# Patient Record
Sex: FEMALE | Race: WHITE | NOT HISPANIC OR LATINO | Employment: OTHER | URBAN - METROPOLITAN AREA
[De-identification: names, ages, dates, MRNs, and addresses within clinical notes are randomized per-mention and may not be internally consistent; named-entity substitution may affect disease eponyms.]

---

## 2017-01-09 RX ORDER — SOTALOL HYDROCHLORIDE 80 MG/1
TABLET ORAL
Qty: 135 TABLET | Refills: 3 | Status: SHIPPED | OUTPATIENT
Start: 2017-01-09 | End: 2017-05-15 | Stop reason: SDUPTHER

## 2017-01-10 DIAGNOSIS — E03.4 HYPOTHYROIDISM DUE TO ACQUIRED ATROPHY OF THYROID: ICD-10-CM

## 2017-01-10 RX ORDER — LEVOTHYROXINE SODIUM 88 UG/1
TABLET ORAL
Qty: 90 TABLET | Refills: 3 | Status: SHIPPED | OUTPATIENT
Start: 2017-01-10 | End: 2017-01-11 | Stop reason: SDUPTHER

## 2017-01-10 NOTE — TELEPHONE ENCOUNTER
----- Message from Alessandra Degroot sent at 1/10/2017  1:52 PM CST -----  levothyroxine (SYNTHROID) 88 MCG tablet refill    [facility]/pharmacy #[id number] - [place] - [address]  [address]  [facility] [address]  Phone: [phone] Fax: [phone]

## 2017-01-11 DIAGNOSIS — E03.4 HYPOTHYROIDISM DUE TO ACQUIRED ATROPHY OF THYROID: ICD-10-CM

## 2017-01-11 RX ORDER — LEVOTHYROXINE SODIUM 88 UG/1
TABLET ORAL
Qty: 90 TABLET | Refills: 3 | Status: SHIPPED | OUTPATIENT
Start: 2017-01-11 | End: 2018-02-01 | Stop reason: SDUPTHER

## 2017-01-11 NOTE — TELEPHONE ENCOUNTER
----- Message from Ariane Farrar sent at 1/11/2017 12:55 PM CST -----  Patient needs a refill of medication:Levothyroxin called into [facility] pharmacy on [address]. Please order or call patient back at [phone] if you have any questions. Thanks!

## 2017-02-16 ENCOUNTER — OFFICE VISIT (OUTPATIENT)
Dept: FAMILY MEDICINE | Facility: CLINIC | Age: 77
End: 2017-02-16
Payer: MEDICARE

## 2017-02-16 VITALS
HEIGHT: 64 IN | OXYGEN SATURATION: 96 % | WEIGHT: 158.31 LBS | HEART RATE: 64 BPM | TEMPERATURE: 99 F | DIASTOLIC BLOOD PRESSURE: 60 MMHG | BODY MASS INDEX: 27.03 KG/M2 | SYSTOLIC BLOOD PRESSURE: 124 MMHG

## 2017-02-16 DIAGNOSIS — J06.9 VIRAL URI: Primary | ICD-10-CM

## 2017-02-16 PROCEDURE — 99213 OFFICE O/P EST LOW 20 MIN: CPT | Mod: S$GLB,,, | Performed by: FAMILY MEDICINE

## 2017-02-16 PROCEDURE — 1125F AMNT PAIN NOTED PAIN PRSNT: CPT | Mod: S$GLB,,, | Performed by: FAMILY MEDICINE

## 2017-02-16 PROCEDURE — 1160F RVW MEDS BY RX/DR IN RCRD: CPT | Mod: S$GLB,,, | Performed by: FAMILY MEDICINE

## 2017-02-16 PROCEDURE — 99999 PR PBB SHADOW E&M-EST. PATIENT-LVL III: CPT | Mod: PBBFAC,,, | Performed by: FAMILY MEDICINE

## 2017-02-16 PROCEDURE — 1159F MED LIST DOCD IN RCRD: CPT | Mod: S$GLB,,, | Performed by: FAMILY MEDICINE

## 2017-02-16 PROCEDURE — 1157F ADVNC CARE PLAN IN RCRD: CPT | Mod: S$GLB,,, | Performed by: FAMILY MEDICINE

## 2017-02-16 RX ORDER — BENZONATATE 100 MG/1
100 CAPSULE ORAL 3 TIMES DAILY PRN
Qty: 30 CAPSULE | Refills: 0 | Status: ON HOLD | OUTPATIENT
Start: 2017-02-16 | End: 2017-02-26 | Stop reason: HOSPADM

## 2017-02-16 NOTE — PROGRESS NOTES
Subjective:       Patient ID: Jackie Landa is a 76 y.o. female.    Chief Complaint: Sinus/chest congestion (Symptoms started Monday evening); Feverish; and Mouth Lesions    HPI Comments: C/o 4 day h/o ST, loss of voice, cough with yellow sputum, rhinorrhea.  Was getting better, then noticed some increased chest congestion and cough.  No fever.  Trying Theraflu, Mucinex 12 hour.    Mouth Lesions    Associated symptoms include rhinorrhea, sore throat and cough. Pertinent negatives include no fever, no abdominal pain, no diarrhea, no nausea, no vomiting, no headaches, no mouth sores, no neck pain, no wheezing, no rash and no eye pain.       Past Medical History   Diagnosis Date    A-fib     ARDS (adult respiratory distress syndrome) 2000    Bladder cancer      s/p BCG, interferon; cancer free since 2006    COPD (chronic obstructive pulmonary disease)      pt has hx of ARDS-hospitalized 1mos    Coronary artery disease      9/2016-Mild, moderate stenosis. Pre and post-stent of the LAD verified by FFR.    Depression     Hiatal hernia     HLD (hyperlipidemia)     Hypothyroidism     Intermittent self-catheterization of bladder     MVP (mitral valve prolapse)     Neurogenic bladder        Past Surgical History   Procedure Laterality Date    Breast surgery  2001     right breast lumpectomy    Appendectomy  1965    Ovarian cyst removal  1975     left and right    Eye surgery      Hernia repair      Hysterectomy  1965    Cardiac pacemaker placement  9/27/13    Coronary angioplasty with stent placement  2006     LAD    Bladder stimulator  2008     not working    Cataract extraction w/ intraocular lens  implant, bilateral  2002    Esophagogastroduodenoscopy      Colonoscopy      Fracture surgery  2009     right ankle with hardware       Review of patient's allergies indicates:   Allergen Reactions    Codeine Itching    Phenergan [promethazine] Itching    Bactrim [sulfamethoxazole-trimethoprim]  Hives    Darcalma [zrnwmp-mvzbg-b.blue-sal-sodium] Other (See Comments)     tachycardia    Levaquin [levofloxacin] Rash and Other (See Comments)     headache    Pyridium [phenazopyridine] Rash       Social History     Social History    Marital status:      Spouse name: N/A    Number of children: 4    Years of education: N/A     Occupational History          Social History Main Topics    Smoking status: Former Smoker     Years: 30.00     Types: Cigarettes     Quit date: 8/14/1987    Smokeless tobacco: Never Used    Alcohol use Yes      Comment: one daily    Drug use: No    Sexual activity: Not on file     Other Topics Concern    Not on file     Social History Narrative    living with her sister        Enjoys RV               Current Outpatient Prescriptions on File Prior to Visit   Medication Sig Dispense Refill    aspirin (ECOTRIN) 81 MG EC tablet Take 81 mg by mouth once daily.      buPROPion (WELLBUTRIN XL) 150 MG TB24 tablet Take 150 mg by mouth once daily.      levothyroxine (SYNTHROID) 88 MCG tablet TAKE 1 TABLET EVERY DAY 90 tablet 3    nitroGLYCERIN (NITROSTAT) 0.4 MG SL tablet Place 1 tablet (0.4 mg total) under the tongue every 5 (five) minutes as needed for Chest pain. 25 tablet 11    omeprazole (PRILOSEC) 40 MG capsule Take 20 mg by mouth daily as needed.      oxybutynin (DITROPAN XL) 15 MG TR24 TAKE 1 TABLET DAILY 90 tablet 1    oxybutynin (DITROPAN-XL) 5 MG TR24 Take 5 mg by mouth once daily.      simvastatin (ZOCOR) 40 MG tablet TAKE 1 TABLET BY MOUTH EVERY EVENING 90 tablet 3    sotalol (BETAPACE) 80 MG tablet Take 80mg QAM and 40mg  tablet 3    tramadol (ULTRAM) 50 mg tablet TAKE 1 TABLET BY MOUTH EVERY 6 HOURS AS NEEDED FOR PAIN, MAY CAUSE SEDATION 30 tablet 0     No current facility-administered medications on file prior to visit.        Family History   Problem Relation Age of Onset    Cancer Father      adrenal    Cancer Paternal Aunt      breast  "   Heart disease Mother        Review of Systems   Constitutional: Negative for appetite change, chills, fever and unexpected weight change.   HENT: Positive for rhinorrhea, sore throat and voice change. Negative for mouth sores and trouble swallowing.    Eyes: Negative for pain and visual disturbance.   Respiratory: Positive for cough. Negative for shortness of breath and wheezing.    Cardiovascular: Negative for chest pain and palpitations.   Gastrointestinal: Negative for abdominal pain, blood in stool, diarrhea, nausea and vomiting.   Genitourinary: Negative for difficulty urinating, dysuria and hematuria.   Musculoskeletal: Negative for arthralgias, gait problem and neck pain.   Skin: Negative for rash and wound.   Neurological: Negative for dizziness, weakness, numbness and headaches.   Hematological: Negative for adenopathy.   Psychiatric/Behavioral: Negative for dysphoric mood.       Objective:      Visit Vitals    /60 (BP Location: Left arm, Patient Position: Sitting, BP Method: Manual)    Pulse 64    Temp 98.9 °F (37.2 °C) (Oral)    Ht 5' 4" (1.626 m)    Wt 71.8 kg (158 lb 4.6 oz)    SpO2 96%    BMI 27.17 kg/m2     Physical Exam   Constitutional: She appears well-developed and well-nourished.   HENT:   Head: Normocephalic and atraumatic.   Right Ear: Tympanic membrane, external ear and ear canal normal.   Left Ear: Hearing, tympanic membrane and external ear normal.   Nose: Nose normal. No rhinorrhea or septal deviation. Right sinus exhibits no maxillary sinus tenderness and no frontal sinus tenderness. Left sinus exhibits no maxillary sinus tenderness and no frontal sinus tenderness.   Mouth/Throat: Oropharynx is clear and moist and mucous membranes are normal. No oropharyngeal exudate, posterior oropharyngeal erythema or tonsillar abscesses.   Eyes: Conjunctivae and EOM are normal. Pupils are equal, round, and reactive to light.   Neck: Normal range of motion. Neck supple.   Cardiovascular: " Normal rate, regular rhythm, normal heart sounds and intact distal pulses.    Pulmonary/Chest: Effort normal and breath sounds normal. She has no wheezes. She has no rales.   Lymphadenopathy:     She has no cervical adenopathy.       Assessment:       1. Viral URI        Plan:       Viral URI  -     benzonatate (TESSALON) 100 MG capsule; Take 1 capsule (100 mg total) by mouth 3 (three) times daily as needed for Cough.  Dispense: 30 capsule; Refill: 0        Patient advised to pursue rest, increase fluids, take OTC multi-symptom cold relief medication of choice, and exercise contact precautions.

## 2017-02-22 ENCOUNTER — CLINICAL SUPPORT (OUTPATIENT)
Dept: CARDIOLOGY | Facility: CLINIC | Age: 77
End: 2017-02-22
Payer: MEDICARE

## 2017-02-22 DIAGNOSIS — Z95.0 CARDIAC PACEMAKER IN SITU: ICD-10-CM

## 2017-02-22 DIAGNOSIS — R00.1 BRADYCARDIA: ICD-10-CM

## 2017-02-22 PROCEDURE — 93280 PM DEVICE PROGR EVAL DUAL: CPT | Mod: S$GLB,,, | Performed by: INTERNAL MEDICINE

## 2017-02-25 PROBLEM — J45.901 ASTHMATIC BRONCHITIS WITH ACUTE EXACERBATION: Status: ACTIVE | Noted: 2017-02-25

## 2017-02-25 PROBLEM — R05.3 PERSISTENT COUGH: Status: ACTIVE | Noted: 2017-02-25

## 2017-02-25 PROBLEM — D72.829 LEUKOCYTOSIS: Status: ACTIVE | Noted: 2017-02-25

## 2017-02-25 PROBLEM — I25.10 CORONARY ARTERY DISEASE INVOLVING NATIVE CORONARY ARTERY OF NATIVE HEART WITHOUT ANGINA PECTORIS: Status: ACTIVE | Noted: 2017-02-25

## 2017-03-01 ENCOUNTER — TELEPHONE (OUTPATIENT)
Dept: FAMILY MEDICINE | Facility: CLINIC | Age: 77
End: 2017-03-01

## 2017-03-01 NOTE — TELEPHONE ENCOUNTER
----- Message from Alessandra Degroot sent at 3/1/2017  9:04 AM CST -----  Patient needs a [facility] f/u, d/c 02/26/17, congestion & fever, please call [phone] to schedule

## 2017-03-07 RX ORDER — TRAMADOL HYDROCHLORIDE 50 MG/1
TABLET ORAL
Qty: 30 TABLET | Refills: 5 | Status: SHIPPED | OUTPATIENT
Start: 2017-03-07 | End: 2017-10-13 | Stop reason: SDUPTHER

## 2017-03-09 ENCOUNTER — OFFICE VISIT (OUTPATIENT)
Dept: FAMILY MEDICINE | Facility: CLINIC | Age: 77
End: 2017-03-09
Payer: MEDICARE

## 2017-03-09 VITALS
BODY MASS INDEX: 26.49 KG/M2 | HEART RATE: 60 BPM | SYSTOLIC BLOOD PRESSURE: 126 MMHG | WEIGHT: 155.19 LBS | TEMPERATURE: 98 F | HEIGHT: 64 IN | OXYGEN SATURATION: 90 % | DIASTOLIC BLOOD PRESSURE: 60 MMHG

## 2017-03-09 DIAGNOSIS — J06.9 VIRAL URI: Primary | ICD-10-CM

## 2017-03-09 PROCEDURE — 99213 OFFICE O/P EST LOW 20 MIN: CPT | Mod: S$GLB,,, | Performed by: FAMILY MEDICINE

## 2017-03-09 PROCEDURE — 99999 PR PBB SHADOW E&M-EST. PATIENT-LVL III: CPT | Mod: PBBFAC,,, | Performed by: FAMILY MEDICINE

## 2017-03-09 NOTE — PROGRESS NOTES
Subjective:       Patient ID: Jackie Landa is a 76 y.o. female.    Chief Complaint: Hospital Follow Up ([facility], about two weeks ago; Shortness of breath)    HPI Comments: Here to f/u on recent hospital admission for viral URI.    Hospital report:  HPI:   The patient is a 76-year-old female. Over the past 2 weeks she has had persistent upper respiratory symptoms to include a cough productive of thick green sputum, dyspnea and wheezing. Approximate one week ago she had an outpatient appointment and was prescribed an oral antibiotic which she took without improvement. Subsequent to that she was evaluated in urgent care center and received a corticosteroid injection without improvement. Due to the persistent symptoms and continued cough and shortness of breath she presented to the emergency department on the day of admission. She had stable vital signs and a clear chest x-ray. The WBC count was 23K and the [facility] Hospitalists were consulted to take over her care and she'll be placed on observation in the hospital for further management.  Hospital Course:   She was placed on observation in the hospital. She received aerosolized bronchodilator treatments, steroids, supplemental oxygen and antibiotics. After an overnight hospital stay she was significantly improved. On clinical exam the wheezing had resolved. WBC count trended down to 20.95. Care coordination included instructions in using a metered-dose inhaler and she was ready to the transition from the hospital and have outpatient follow-up as needed. No further antibiotics needed for suspected viral URI.   She was discharged on steroids and albuterol nebs.    She still reports persistent cough with some mucus, easy fatigue.  She feels she is improving.  Some SOB with activity.  Cough is worse at night.  No fever.    Transitional Care Note    Family and/or Caretaker present at visit?  No.  Diagnostic tests reviewed/disposition: No diagnosic tests pending after this  hospitalization.  Disease/illness education: routine  Home health/community services discussion/referrals: Patient does not have home health established from hospital visit.  They do not need home health.  If needed, we will set up home health for the patient.   Establishment or re-establishment of referral orders for community resources: No other necessary community resources.   Discussion with other health care providers: No discussion with other health care providers necessary.                 Past Medical History:   Diagnosis Date    A-fib     ARDS (adult respiratory distress syndrome) 2000    Bladder cancer     s/p BCG, interferon; cancer free since 2006    COPD (chronic obstructive pulmonary disease)     pt has hx of ARDS-hospitalized 1mos    Coronary artery disease     9/2016-Mild, moderate stenosis. Pre and post-stent of the LAD verified by FFR.    Depression     GERD (gastroesophageal reflux disease)     Hiatal hernia     HLD (hyperlipidemia)     Hypothyroidism     Intermittent self-catheterization of bladder     MVP (mitral valve prolapse)     Neurogenic bladder        Past Surgical History:   Procedure Laterality Date    APPENDECTOMY  1965    bladder stimulator  2008    not working    BREAST SURGERY  2001    right breast lumpectomy    CARDIAC CATHETERIZATION  09/02/2016    CARDIAC PACEMAKER PLACEMENT  9/27/13    CARDIAC PACEMAKER PLACEMENT  09/27/2013    CATARACT EXTRACTION W/ INTRAOCULAR LENS  IMPLANT, BILATERAL  2002    COLONOSCOPY      CORONARY ANGIOPLASTY WITH STENT PLACEMENT  2006    LAD    ESOPHAGOGASTRODUODENOSCOPY      EYE SURGERY Bilateral     FRACTURE SURGERY  2009    right ankle with hardware    HERNIA REPAIR      HIP SURGERY Right 2008    HYSTERECTOMY  1965    OVARIAN CYST REMOVAL  1975    left and right       Review of patient's allergies indicates:   Allergen Reactions    Codeine Itching    Phenergan [promethazine] Itching    Bactrim  [sulfamethoxazole-trimethoprim] Hives    Darcalma [vfhffh-nxwxf-h.blue-sal-sodium] Other (See Comments)     tachycardia    Levaquin [levofloxacin] Rash and Other (See Comments)     headache    Pyridium [phenazopyridine] Rash       Social History     Social History    Marital status:      Spouse name: N/A    Number of children: 4    Years of education: N/A     Occupational History          Social History Main Topics    Smoking status: Former Smoker     Years: 30.00     Types: Cigarettes     Quit date: 8/14/1987    Smokeless tobacco: Never Used    Alcohol use Yes      Comment: one daily    Drug use: No    Sexual activity: Not on file     Other Topics Concern    Not on file     Social History Narrative    living with her sister        Enjoys RV               Current Outpatient Prescriptions on File Prior to Visit   Medication Sig Dispense Refill    aspirin (ECOTRIN) 81 MG EC tablet Take 81 mg by mouth once daily.      buPROPion (WELLBUTRIN XL) 150 MG TB24 tablet Take 150 mg by mouth once daily.      levothyroxine (SYNTHROID) 88 MCG tablet TAKE 1 TABLET EVERY DAY 90 tablet 3    multivitamin (ONE DAILY MULTIVITAMIN) per tablet Take 1 tablet by mouth once daily.      omeprazole (PRILOSEC) 20 MG capsule Take 20 mg by mouth daily as needed.      oxybutynin (DITROPAN XL) 15 MG TR24 TAKE 1 TABLET DAILY 90 tablet 1    simvastatin (ZOCOR) 40 MG tablet TAKE 1 TABLET BY MOUTH EVERY EVENING 90 tablet 3    tramadol (ULTRAM) 50 mg tablet TAKE 1 TABLET BY MOUTH EVERY 6 HOURS AS NEEDED FOR PAIN, MAY CAUSE SEDATION 30 tablet 5    albuterol 90 mcg/actuation inhaler Inhale 2 puffs into the lungs every 4 (four) hours as needed for Wheezing or Shortness of Breath. Rescue 1 Inhaler 0    nitroGLYCERIN (NITROSTAT) 0.4 MG SL tablet Place 1 tablet (0.4 mg total) under the tongue every 5 (five) minutes as needed for Chest pain. 25 tablet 11    sod chlor-bicarb-squeez bottle (NEILMED SINUS RINSE COMPLETE)  "pkdv 1 packet by Nasal route 4 (four) times daily. 25 each     sotalol (BETAPACE) 80 MG tablet Take 80mg QAM and 40mg QHS (Patient taking differently: Take 80 mg by mouth 2 (two) times daily. Take 80mg QAM and 40mg QHS) 135 tablet 3     No current facility-administered medications on file prior to visit.        Family History   Problem Relation Age of Onset    Cancer Father      adrenal    Cancer Paternal Aunt      breast    Heart disease Mother        Review of Systems   Constitutional: Negative for appetite change, chills, fever and unexpected weight change.   HENT: Negative for sore throat and trouble swallowing.    Eyes: Negative for pain and visual disturbance.   Respiratory: Positive for cough. Negative for shortness of breath and wheezing.    Cardiovascular: Negative for chest pain and palpitations.   Gastrointestinal: Negative for abdominal pain, blood in stool, diarrhea, nausea and vomiting.   Genitourinary: Negative for difficulty urinating, dysuria and hematuria.   Musculoskeletal: Negative for arthralgias, gait problem and neck pain.   Skin: Negative for rash and wound.   Neurological: Negative for dizziness, weakness, numbness and headaches.   Hematological: Negative for adenopathy.   Psychiatric/Behavioral: Negative for dysphoric mood.       Objective:      /60 (BP Location: Left arm, Patient Position: Sitting, BP Method: Manual)  Pulse 60  Temp 98.3 °F (36.8 °C) (Oral)   Ht 5' 4" (1.626 m)  Wt 70.4 kg (155 lb 3.3 oz)  LMP  (LMP Unknown)  SpO2 (!) 90%  BMI 26.64 kg/m2  Physical Exam   Constitutional: She appears well-developed and well-nourished.   HENT:   Head: Normocephalic and atraumatic.   Right Ear: Tympanic membrane, external ear and ear canal normal.   Left Ear: Hearing, tympanic membrane and external ear normal.   Nose: Nose normal. No rhinorrhea or septal deviation. Right sinus exhibits no maxillary sinus tenderness and no frontal sinus tenderness. Left sinus exhibits no " maxillary sinus tenderness and no frontal sinus tenderness.   Mouth/Throat: Oropharynx is clear and moist and mucous membranes are normal. No oropharyngeal exudate, posterior oropharyngeal erythema or tonsillar abscesses.   Eyes: Conjunctivae and EOM are normal. Pupils are equal, round, and reactive to light.   Neck: Normal range of motion. Neck supple.   Cardiovascular: Normal rate, regular rhythm, normal heart sounds and intact distal pulses.    Pulmonary/Chest: Effort normal and breath sounds normal. She has no wheezes. She has no rales.   Lymphadenopathy:     She has no cervical adenopathy.         Hospital records records reviewed    Assessment:       1. Viral URI        Plan:       Viral URI        Reassurance  Improving  Continue albuterol HFA twice a day as needed  Counseled on regular exercise, maintenance of a healthy weight, balanced diet rich in fruits/vegetables and lean protein, and avoidance of unhealthy habits like smoking and excessive alcohol intake.

## 2017-05-15 ENCOUNTER — OFFICE VISIT (OUTPATIENT)
Dept: CARDIOLOGY | Facility: CLINIC | Age: 77
End: 2017-05-15
Payer: MEDICARE

## 2017-05-15 VITALS
HEIGHT: 64 IN | HEART RATE: 60 BPM | SYSTOLIC BLOOD PRESSURE: 116 MMHG | WEIGHT: 159.63 LBS | DIASTOLIC BLOOD PRESSURE: 72 MMHG | BODY MASS INDEX: 27.25 KG/M2

## 2017-05-15 DIAGNOSIS — Z95.0 CARDIAC PACEMAKER IN SITU: Chronic | ICD-10-CM

## 2017-05-15 DIAGNOSIS — Z86.79 HISTORY OF ATRIAL FIBRILLATION: ICD-10-CM

## 2017-05-15 DIAGNOSIS — Z95.5 S/P CORONARY ARTERY STENT PLACEMENT: ICD-10-CM

## 2017-05-15 DIAGNOSIS — I25.111 CORONARY ARTERY DISEASE INVOLVING NATIVE CORONARY ARTERY OF NATIVE HEART WITH ANGINA PECTORIS WITH DOCUMENTED SPASM: Primary | ICD-10-CM

## 2017-05-15 DIAGNOSIS — E78.5 DYSLIPIDEMIA: ICD-10-CM

## 2017-05-15 PROCEDURE — 99999 PR PBB SHADOW E&M-EST. PATIENT-LVL III: CPT | Mod: PBBFAC,,, | Performed by: INTERNAL MEDICINE

## 2017-05-15 PROCEDURE — 1159F MED LIST DOCD IN RCRD: CPT | Mod: S$GLB,,, | Performed by: INTERNAL MEDICINE

## 2017-05-15 PROCEDURE — 1126F AMNT PAIN NOTED NONE PRSNT: CPT | Mod: S$GLB,,, | Performed by: INTERNAL MEDICINE

## 2017-05-15 PROCEDURE — 99499 UNLISTED E&M SERVICE: CPT | Mod: S$PBB,,, | Performed by: INTERNAL MEDICINE

## 2017-05-15 PROCEDURE — 99214 OFFICE O/P EST MOD 30 MIN: CPT | Mod: S$GLB,,, | Performed by: INTERNAL MEDICINE

## 2017-05-15 PROCEDURE — 1160F RVW MEDS BY RX/DR IN RCRD: CPT | Mod: S$GLB,,, | Performed by: INTERNAL MEDICINE

## 2017-05-15 RX ORDER — ALBUTEROL SULFATE 90 UG/1
AEROSOL, METERED RESPIRATORY (INHALATION)
Qty: 1 INHALER | Refills: 11 | Status: SHIPPED | OUTPATIENT
Start: 2017-05-15 | End: 2020-09-15 | Stop reason: SDUPTHER

## 2017-05-15 RX ORDER — SOTALOL HYDROCHLORIDE 80 MG/1
80 TABLET ORAL 2 TIMES DAILY
Qty: 180 TABLET | Refills: 5 | Status: SHIPPED | OUTPATIENT
Start: 2017-05-15 | End: 2018-04-20 | Stop reason: SDUPTHER

## 2017-05-15 NOTE — PROGRESS NOTES
Subjective:    Patient ID:  Jackie Landa is a 76 y.o. female who presents for follow-up of Coronary Artery Disease (9 month f/u )      HPI  Here for follow up of CAD-PCI (2006)/PPM. Patients states is doing well no chest pain, SOB or change in exertional tolerence. Patient does not exercise but remains very active with out change in exertional tolerance or chest pain.     Review of Systems   Constitution: Negative for chills, decreased appetite, weakness and night sweats.   HENT: Negative for headaches and nosebleeds.    Eyes: Negative for blurred vision and visual disturbance.   Cardiovascular: Negative for chest pain, claudication, cyanosis, dyspnea on exertion, irregular heartbeat, leg swelling, near-syncope, orthopnea, palpitations, paroxysmal nocturnal dyspnea and syncope.   Respiratory: Negative for cough and shortness of breath.    Endocrine: Negative for polyuria.   Hematologic/Lymphatic: Does not bruise/bleed easily.   Skin: Negative for flushing and rash.   Musculoskeletal: Negative for arthritis, joint pain, muscle cramps and myalgias.   Gastrointestinal: Negative for abdominal pain, change in bowel habit and heartburn.   Genitourinary: Negative for bladder incontinence.   Neurological: Negative for dizziness, light-headedness and loss of balance.   Psychiatric/Behavioral: Negative for altered mental status.        Objective:    Physical Exam   Constitutional: She is oriented to person, place, and time. She appears well-developed and well-nourished.   HENT:   Head: Normocephalic.   Eyes: Conjunctivae are normal.   Neck: Normal range of motion. Neck supple. No JVD present.   Cardiovascular: Normal rate, regular rhythm, normal heart sounds and intact distal pulses.    Pulses:       Carotid pulses are 2+ on the right side, and 2+ on the left side.       Radial pulses are 2+ on the right side, and 2+ on the left side.        Dorsalis pedis pulses are 2+ on the right side, and 2+ on the left side.         Posterior tibial pulses are 2+ on the right side, and 2+ on the left side.   Pacer site clean and dry, well healed.     Pulmonary/Chest: Effort normal and breath sounds normal.   Abdominal: Soft. Bowel sounds are normal.   Musculoskeletal: She exhibits no edema or tenderness.   Neurological: She is alert and oriented to person, place, and time. Gait normal.   Skin: Skin is warm, dry and intact. No cyanosis. Nails show no clubbing.   Psychiatric: She has a normal mood and affect. Her speech is normal and behavior is normal. Thought content normal.             ..    Chemistry        Component Value Date/Time     02/25/2017 0844    K 4.2 02/25/2017 0844     02/25/2017 0844    CO2 30 02/25/2017 0844    BUN 15 02/25/2017 0844    CREATININE 0.91 02/25/2017 0844    GLU 99 02/25/2017 0844        Component Value Date/Time    CALCIUM 8.9 02/25/2017 0844    ALKPHOS 106 02/25/2017 0844    AST 37 (H) 02/25/2017 0844    ALT 49 (H) 02/25/2017 0844    BILITOT 1.2 02/25/2017 0844            ..  Lab Results   Component Value Date    CHOL 164 08/27/2016    CHOL 148 12/01/2015    CHOL 161 08/03/2015     Lab Results   Component Value Date    HDL 66 08/27/2016    HDL 59 12/01/2015    HDL 64 08/03/2015     Lab Results   Component Value Date    LDLCALC 69.8 08/27/2016    LDLCALC 69.4 12/01/2015    LDLCALC 78.8 08/03/2015     Lab Results   Component Value Date    TRIG 141 08/27/2016    TRIG 98 12/01/2015    TRIG 91 08/03/2015     Lab Results   Component Value Date    CHOLHDL 40.2 08/27/2016    CHOLHDL 39.9 12/01/2015    CHOLHDL 39.8 08/03/2015     ..  Lab Results   Component Value Date    WBC 20.95 (H) 02/26/2017    HGB 11.8 (L) 02/26/2017    HCT 37.2 02/26/2017    MCV 99 (H) 02/26/2017     02/26/2017       Test(s) Reviewed  I have reviewed the following in detail:  [] Stress test   [] Angiography   [x] Echocardiogram   [x] Labs   [] Other:       Assessment:         ICD-10-CM ICD-9-CM   1. Coronary artery disease  involving native coronary artery of native heart with angina pectoris with documented spasm I25.111 414.01     413.9   2. S/P coronary artery stent placement - LAD 2006; Patent 09/2012 Z95.5 V45.82   3. History of atrial fibrillation - controlled on sotolol  Z86.79 V12.59   4. Cardiac pacemaker in situ Z95.0 V45.01   5. Dyslipidemia E78.5 272.4     Problem List Items Addressed This Visit     CAD (coronary artery disease), native coronary artery - Primary    Overview     9/2016  [facility]  Mild, moderate stenosis. Pre and post-stent of the LAD verified by FFR.  Normal diastolic function and normal systolic function.         Cardiac pacemaker in situ (Chronic)    Overview     10/13 [employer]           Dyslipidemia    History of atrial fibrillation - controlled on sotolol     S/P coronary artery stent placement - LAD 2006; Patent 09/2012    Overview     2012 angio Patent LAD stent with 50-60 post stent dsx FFR 0.88                Plan:           Return to clinic 1 year   Low level/low impact aerobic exercise 5x's/wk. Heart healthy diet and risk factor modification.    See labs and med orders.

## 2017-05-15 NOTE — MR AVS SNAPSHOT
[facility]  [address]  [facility] [address]  Phone: [phone]                  Jackie Landa   5/15/2017 2:40 PM   Office Visit    Description:  Female : 1940   Provider:  Vincent Patterson MD   Department:  [place] - Cardiology           Reason for Visit     Coronary Artery Disease           Diagnoses this Visit        Comments    Coronary artery disease involving native coronary artery of native heart with angina pectoris with documented spasm    -  Primary     S/P coronary artery stent placement         History of atrial fibrillation         Cardiac pacemaker in situ         Dyslipidemia                To Do List           Future Appointments        Provider Department Dept Phone    2017 11:00 AM TELEPHONE CHECK, PACEMAKER [facility] - Arrhythmia [phone]      Goals (5 Years of Data)     None      Follow-Up and Disposition     Return in about 1 year (around 5/15/2018).       These Medications        Disp Refills Start End    sotalol (BETAPACE) 80 MG tablet 180 tablet 5 5/15/2017     Take 1 tablet (80 mg total) by mouth 2 (two) times daily. Take 80mg QAM and 40mg QHS - Oral    Pharmacy: [facility]/pharmacy #[id number] - [facility] [address] Ph #: [phone]       albuterol (VENTOLIN HFA) 90 mcg/actuation inhaler 1 Inhaler 11 5/15/2017     INHALE 2 PUFFS INTO THE LUNGS EVERY 4 HOURS AS NEEDED FOR WHEEZING OR SHORTNESS OF BREATH    Pharmacy: [facility]/pharmacy #[id number] - [place], [place] - [address] Ph #: [phone]         [facility]     [facility] Nurse Care [facility] -  Assistance  Unless otherwise directed by your provider, please contact [facility], our nurse care line that is available for  assistance.     Registered nurses in the [facility] provide: appointment scheduling, clinical advisement, health education, and other advisory services.  Call: [phone] (toll free)               Medications           CHANGE how  you are taking these medications     Start Taking Instead of    sotalol (BETAPACE) 80 MG tablet sotalol (BETAPACE) 80 MG tablet    Dosage:  Take 1 tablet (80 mg total) by mouth 2 (two) times daily. Take 80mg QAM and 40mg QHS Dosage:  Take 80mg QAM and 40mg QHS    Reason for Change:  Reorder     albuterol (VENTOLIN HFA) 90 mcg/actuation inhaler VENTOLIN HFA 90 mcg/actuation inhaler    Dosage:  INHALE 2 PUFFS INTO THE LUNGS EVERY 4 HOURS AS NEEDED FOR WHEEZING OR SHORTNESS OF BREATH Dosage:  INHALE 2 PUFFS INTO THE LUNGS EVERY 4 HOURS AS NEEDED FOR WHEEZING OR SHORTNESS OF BREATH    Reason for Change:  Reorder            Verify that the below list of medications is an accurate representation of the medications you are currently taking.  If none reported, the list may be blank. If incorrect, please contact your healthcare provider. Carry this list with you in case of emergency.           Current Medications     albuterol (VENTOLIN HFA) 90 mcg/actuation inhaler INHALE 2 PUFFS INTO THE LUNGS EVERY 4 HOURS AS NEEDED FOR WHEEZING OR SHORTNESS OF BREATH    aspirin (ECOTRIN) 81 MG EC tablet Take 81 mg by mouth once daily.    buPROPion (WELLBUTRIN XL) 150 MG TB24 tablet Take 150 mg by mouth once daily.    levothyroxine (SYNTHROID) 88 MCG tablet TAKE 1 TABLET EVERY DAY    multivitamin (ONE DAILY MULTIVITAMIN) per tablet Take 1 tablet by mouth once daily.    nitroGLYCERIN (NITROSTAT) 0.4 MG SL tablet Place 1 tablet (0.4 mg total) under the tongue every 5 (five) minutes as needed for Chest pain.    omeprazole (PRILOSEC) 20 MG capsule Take 20 mg by mouth daily as needed.    oxybutynin (DITROPAN XL) 15 MG TR24 TAKE 1 TABLET DAILY    simvastatin (ZOCOR) 40 MG tablet TAKE 1 TABLET BY MOUTH EVERY EVENING    sod chlor-bicarb-squeez bottle (NEILMED SINUS RINSE COMPLETE) pkdv 1 packet by Nasal route 4 (four) times daily.    sotalol (BETAPACE) 80 MG tablet Take 1 tablet (80 mg total) by mouth 2 (two) times daily. Take 80mg QAM and 40mg  "QHS    tramadol (ULTRAM) 50 mg tablet TAKE 1 TABLET BY MOUTH EVERY 6 HOURS AS NEEDED FOR PAIN, MAY CAUSE SEDATION           Clinical Reference Information           Your Vitals Were     BP Pulse Height Weight Last Period BMI    116/72 (BP Location: Right arm, Patient Position: Sitting, BP Method: Automatic) 60 5' 4" (1.626 m) 72.4 kg (159 lb 9.8 oz) (LMP Unknown) 27.4 kg/m2      Blood Pressure          Most Recent Value    BP  116/72      Allergies as of 5/15/2017     Codeine    Phenergan [Promethazine]    Bactrim [Sulfamethoxazole-trimethoprim]    Darcalma [Hnznla-xxfjf-a.blue-sal-sodium]    Levaquin [Levofloxacin]    Pyridium [Phenazopyridine]      Immunizations Administered on Date of Encounter - 5/15/2017     None      Orders Placed During Today's Visit     Future Labs/Procedures Expected by Expires    2D echo with color flow doppler  5/15/2018 5/16/2018    CBC auto differential  5/15/2018 7/14/2018    Comprehensive metabolic panel  5/15/2018 5/16/2018    Lipid panel  5/15/2018 5/16/2018    TSH  5/15/2018 5/16/2018      Language Assistance Services     ATTENTION: Language assistance services are available, free of charge. Please call [phone].      ATENCIÓN: Si [place], tiene a [place] disposición servicios gratuitos de asistencia lingüística. Llame al [phone].     CHÚ Ý: N?u b?n nói Ti?ng Vi?t, có các d?ch v? h? tr? ngôn ng? mi?n phí dành cho b?n. G?i s? [phone].         [facility] complies with applicable Federal civil rights laws and does not discriminate on the basis of race, color, national origin, age, disability, or sex.        "

## 2017-05-24 ENCOUNTER — CLINICAL SUPPORT (OUTPATIENT)
Dept: ELECTROPHYSIOLOGY | Facility: CLINIC | Age: 77
End: 2017-05-24
Payer: MEDICARE

## 2017-05-24 DIAGNOSIS — Z95.0 CARDIAC PACEMAKER IN SITU: ICD-10-CM

## 2017-05-24 DIAGNOSIS — R00.1 BRADYCARDIA: Chronic | ICD-10-CM

## 2017-05-24 PROCEDURE — 93293 PM PHONE R-STRIP DEVICE EVAL: CPT | Mod: S$GLB,,, | Performed by: INTERNAL MEDICINE

## 2017-05-25 ENCOUNTER — TELEPHONE (OUTPATIENT)
Dept: FAMILY MEDICINE | Facility: CLINIC | Age: 77
End: 2017-05-25

## 2017-05-25 ENCOUNTER — OFFICE VISIT (OUTPATIENT)
Dept: FAMILY MEDICINE | Facility: CLINIC | Age: 77
End: 2017-05-25
Payer: MEDICARE

## 2017-05-25 ENCOUNTER — HOSPITAL ENCOUNTER (OUTPATIENT)
Dept: RADIOLOGY | Facility: HOSPITAL | Age: 77
Discharge: HOME | End: 2017-05-25
Attending: NURSE PRACTITIONER
Payer: MEDICARE

## 2017-05-25 VITALS
HEIGHT: 64 IN | SYSTOLIC BLOOD PRESSURE: 122 MMHG | TEMPERATURE: 98 F | BODY MASS INDEX: 27.13 KG/M2 | WEIGHT: 158.94 LBS | DIASTOLIC BLOOD PRESSURE: 70 MMHG | OXYGEN SATURATION: 98 % | HEART RATE: 60 BPM

## 2017-05-25 DIAGNOSIS — M89.8X1 SHOULDER BLADE PAIN: ICD-10-CM

## 2017-05-25 DIAGNOSIS — M54.2 NECK PAIN: ICD-10-CM

## 2017-05-25 DIAGNOSIS — M54.2 NECK PAIN: Primary | ICD-10-CM

## 2017-05-25 DIAGNOSIS — M79.10 MYALGIA: ICD-10-CM

## 2017-05-25 PROCEDURE — 1126F AMNT PAIN NOTED NONE PRSNT: CPT | Mod: S$GLB,,, | Performed by: NURSE PRACTITIONER

## 2017-05-25 PROCEDURE — 99213 OFFICE O/P EST LOW 20 MIN: CPT | Mod: 25,S$GLB,, | Performed by: NURSE PRACTITIONER

## 2017-05-25 PROCEDURE — 72070 X-RAY EXAM THORAC SPINE 2VWS: CPT | Mod: 26,,, | Performed by: RADIOLOGY

## 2017-05-25 PROCEDURE — 99999 PR PBB SHADOW E&M-EST. PATIENT-LVL V: CPT | Mod: PBBFAC,,, | Performed by: NURSE PRACTITIONER

## 2017-05-25 PROCEDURE — 72070 X-RAY EXAM THORAC SPINE 2VWS: CPT | Mod: TC,PO

## 2017-05-25 PROCEDURE — 72050 X-RAY EXAM NECK SPINE 4/5VWS: CPT | Mod: 26,,, | Performed by: RADIOLOGY

## 2017-05-25 PROCEDURE — 96372 THER/PROPH/DIAG INJ SC/IM: CPT | Mod: S$GLB,,, | Performed by: NURSE PRACTITIONER

## 2017-05-25 PROCEDURE — 1159F MED LIST DOCD IN RCRD: CPT | Mod: S$GLB,,, | Performed by: NURSE PRACTITIONER

## 2017-05-25 PROCEDURE — 72050 X-RAY EXAM NECK SPINE 4/5VWS: CPT | Mod: TC,PO

## 2017-05-25 RX ORDER — KETOROLAC TROMETHAMINE 30 MG/ML
30 INJECTION, SOLUTION INTRAMUSCULAR; INTRAVENOUS
Status: COMPLETED | OUTPATIENT
Start: 2017-05-25 | End: 2017-05-25

## 2017-05-25 RX ORDER — METHYLPREDNISOLONE 4 MG/1
TABLET ORAL
Qty: 1 PACKAGE | Refills: 0 | Status: SHIPPED | OUTPATIENT
Start: 2017-05-25 | End: 2017-06-15

## 2017-05-25 RX ORDER — TIZANIDINE 4 MG/1
4 TABLET ORAL NIGHTLY PRN
Qty: 30 TABLET | Refills: 0 | Status: SHIPPED | OUTPATIENT
Start: 2017-05-25 | End: 2017-06-04

## 2017-05-25 RX ADMIN — KETOROLAC TROMETHAMINE 30 MG: 30 INJECTION, SOLUTION INTRAMUSCULAR; INTRAVENOUS at 11:05

## 2017-05-25 NOTE — PROGRESS NOTES
Subjective:       Patient ID: Jackie Landa is a 76 y.o. female.    Chief Complaint: Neck Pain (pain in neck and upper back for a month.On/off pain,sharp pain,very painful it gives her a headache)    Over did it working in the garden       Neck Pain    This is a new problem. The current episode started more than 1 month ago. The problem occurs constantly. The problem has been gradually worsening. The pain is associated with nothing. The pain is present in the right side. The pain is at a severity of 3/10. The pain is mild. The symptoms are aggravated by bending and twisting. The pain is same all the time. Associated symptoms include headaches and numbness. Pertinent negatives include no chest pain, fever, leg pain, pain with swallowing, paresis, photophobia, syncope, tingling, trouble swallowing, visual change, weakness or weight loss. She has tried ice, NSAIDs, neck support, home exercises and heat (muscle rub) for the symptoms.     Vitals:    05/25/17 1058   BP: 122/70   Pulse: 60   Temp: 98.3 °F (36.8 °C)     Review of Systems   Constitutional: Negative.  Negative for diaphoresis, fatigue, fever and weight loss.   HENT: Negative for trouble swallowing.    Eyes: Negative.  Negative for photophobia.   Respiratory: Negative.  Negative for cough, shortness of breath and wheezing.    Cardiovascular: Negative.  Negative for chest pain and syncope.   Gastrointestinal: Negative.  Negative for abdominal pain, diarrhea and nausea.   Endocrine: Negative.    Genitourinary: Negative.  Negative for dysuria and hematuria.   Musculoskeletal: Positive for arthralgias, myalgias, neck pain and neck stiffness.   Skin: Negative for color change and rash.   Allergic/Immunologic: Negative.    Neurological: Positive for numbness and headaches. Negative for tingling, speech difficulty and weakness.   Hematological: Negative.    Psychiatric/Behavioral: Negative.        Past Medical History:   Diagnosis Date    A-fib     ARDS  (adult respiratory distress syndrome) 2000    Bladder cancer     s/p BCG, interferon; cancer free since 2006    COPD (chronic obstructive pulmonary disease)     pt has hx of ARDS-hospitalized 1mos    Coronary artery disease     9/2016-Mild, moderate stenosis. Pre and post-stent of the LAD verified by FFR.    Depression     GERD (gastroesophageal reflux disease)     Hiatal hernia     HLD (hyperlipidemia)     Hypothyroidism     Intermittent self-catheterization of bladder     MVP (mitral valve prolapse)     Neurogenic bladder      Objective:      Physical Exam   Constitutional: She is oriented to person, place, and time. She appears well-developed and well-nourished.   HENT:   Head: Normocephalic and atraumatic.   Mouth/Throat: Oropharynx is clear and moist.   Eyes: Conjunctivae and EOM are normal. Pupils are equal, round, and reactive to light.   Neck: Neck supple.   Cardiovascular: Normal rate, regular rhythm, normal heart sounds and intact distal pulses.    Pulmonary/Chest: Effort normal and breath sounds normal.   Abdominal: Soft. Bowel sounds are normal.   Musculoskeletal:        Cervical back: She exhibits decreased range of motion, tenderness, pain and spasm.   Neurological: She is alert and oriented to person, place, and time.   Skin: Skin is warm and dry.   Psychiatric: She has a normal mood and affect. Her behavior is normal.   Nursing note and vitals reviewed.      Assessment:       1. Neck pain    2. Myalgia    3. Shoulder blade pain        Plan:       Neck pain  -     X-Ray Cervical Spine Complete 5 view; Future; Expected date: 05/25/2017  -     X-Ray Thoracic Spine AP Lateral; Future; Expected date: 05/25/2017  -     tizanidine (ZANAFLEX) 4 MG tablet; Take 1 tablet (4 mg total) by mouth nightly as needed.  Dispense: 30 tablet; Refill: 0  -     ketorolac injection 30 mg; Inject 1 mL (30 mg total) into the muscle one time.  -     methylPREDNISolone (MEDROL DOSEPACK) 4 mg tablet; use as directed   Dispense: 1 Package; Refill: 0    Myalgia  -     X-Ray Cervical Spine Complete 5 view; Future; Expected date: 05/25/2017  -     X-Ray Thoracic Spine AP Lateral; Future; Expected date: 05/25/2017  -     tizanidine (ZANAFLEX) 4 MG tablet; Take 1 tablet (4 mg total) by mouth nightly as needed.  Dispense: 30 tablet; Refill: 0  -     ketorolac injection 30 mg; Inject 1 mL (30 mg total) into the muscle one time.  -     methylPREDNISolone (MEDROL DOSEPACK) 4 mg tablet; use as directed  Dispense: 1 Package; Refill: 0    Shoulder blade pain  -     X-Ray Cervical Spine Complete 5 view; Future; Expected date: 05/25/2017  -     X-Ray Thoracic Spine AP Lateral; Future; Expected date: 05/25/2017  -     tizanidine (ZANAFLEX) 4 MG tablet; Take 1 tablet (4 mg total) by mouth nightly as needed.  Dispense: 30 tablet; Refill: 0  -     ketorolac injection 30 mg; Inject 1 mL (30 mg total) into the muscle one time.  -     methylPREDNISolone (MEDROL DOSEPACK) 4 mg tablet; use as directed  Dispense: 1 Package; Refill: 0            Will treat with meds and xray today - follow up with PT if not better

## 2017-05-25 NOTE — TELEPHONE ENCOUNTER
----- Message from Demetra Hill sent at 5/25/2017  9:29 AM CDT -----  Contact: self   Patient wants to speak with a nurse regarding orderding a xray for her neck and back please call patient back at [phone]

## 2017-06-23 RX ORDER — OXYBUTYNIN CHLORIDE 15 MG/1
TABLET, EXTENDED RELEASE ORAL
Qty: 90 TABLET | Refills: 1 | Status: SHIPPED | OUTPATIENT
Start: 2017-06-23 | End: 2018-01-06 | Stop reason: SDUPTHER

## 2017-07-06 ENCOUNTER — TELEPHONE (OUTPATIENT)
Dept: FAMILY MEDICINE | Facility: CLINIC | Age: 77
End: 2017-07-06

## 2017-07-06 NOTE — TELEPHONE ENCOUNTER
----- Message from Ariane Moran sent at 7/6/2017  3:12 PM CDT -----  [facility] pharmacy called regarding catheres and supplies for the above patient, please send clinical notes with prescription . There are requesting a refill fax to [phone] contact [facility] at [phone] option 2 with any questions.     Thank you

## 2017-08-29 ENCOUNTER — CLINICAL SUPPORT (OUTPATIENT)
Dept: ELECTROPHYSIOLOGY | Facility: CLINIC | Age: 77
End: 2017-08-29
Payer: MEDICARE

## 2017-08-29 DIAGNOSIS — R00.1 BRADYCARDIA: Chronic | ICD-10-CM

## 2017-08-29 DIAGNOSIS — Z95.0 CARDIAC PACEMAKER IN SITU: ICD-10-CM

## 2017-08-29 PROCEDURE — 93293 PM PHONE R-STRIP DEVICE EVAL: CPT | Mod: S$GLB,,, | Performed by: INTERNAL MEDICINE

## 2017-10-10 PROBLEM — N39.41 URGE INCONTINENCE: Status: ACTIVE | Noted: 2017-10-10

## 2017-10-10 PROBLEM — C67.8 CANCER OF OVERLAPPING SITES OF BLADDER: Status: ACTIVE | Noted: 2017-10-10

## 2017-10-13 RX ORDER — TRAMADOL HYDROCHLORIDE 50 MG/1
TABLET ORAL
Qty: 30 TABLET | Refills: 5 | Status: SHIPPED | OUTPATIENT
Start: 2017-10-13 | End: 2018-07-28 | Stop reason: SDUPTHER

## 2017-11-03 DIAGNOSIS — R00.1 BRADYCARDIA: ICD-10-CM

## 2017-11-03 DIAGNOSIS — Z95.0 CARDIAC PACEMAKER IN SITU: Primary | ICD-10-CM

## 2017-12-12 ENCOUNTER — CLINICAL SUPPORT (OUTPATIENT)
Dept: CARDIOLOGY | Facility: CLINIC | Age: 77
End: 2017-12-12
Attending: INTERNAL MEDICINE
Payer: MEDICARE

## 2017-12-12 DIAGNOSIS — Z95.0 CARDIAC PACEMAKER IN SITU: ICD-10-CM

## 2017-12-12 DIAGNOSIS — R00.1 BRADYCARDIA: ICD-10-CM

## 2017-12-12 PROCEDURE — 93280 PM DEVICE PROGR EVAL DUAL: CPT | Mod: S$GLB,,, | Performed by: INTERNAL MEDICINE

## 2017-12-22 RX ORDER — SIMVASTATIN 40 MG/1
TABLET, FILM COATED ORAL
Qty: 90 TABLET | Refills: 3 | Status: SHIPPED | OUTPATIENT
Start: 2017-12-22 | End: 2018-04-20 | Stop reason: SDUPTHER

## 2018-01-08 RX ORDER — OXYBUTYNIN CHLORIDE 15 MG/1
TABLET, EXTENDED RELEASE ORAL
Qty: 90 TABLET | Refills: 1 | Status: SHIPPED | OUTPATIENT
Start: 2018-01-08 | End: 2018-06-25 | Stop reason: SDUPTHER

## 2018-01-16 ENCOUNTER — HOSPITAL ENCOUNTER (OUTPATIENT)
Dept: RADIOLOGY | Facility: HOSPITAL | Age: 78
Discharge: HOME | End: 2018-01-16
Attending: FAMILY MEDICINE
Payer: MEDICARE

## 2018-01-16 DIAGNOSIS — Z00.00 ANNUAL PHYSICAL EXAM: ICD-10-CM

## 2018-01-16 PROCEDURE — 77063 BREAST TOMOSYNTHESIS BI: CPT | Mod: 26,,, | Performed by: RADIOLOGY

## 2018-01-16 PROCEDURE — 77067 SCR MAMMO BI INCL CAD: CPT | Mod: 26,,, | Performed by: RADIOLOGY

## 2018-01-16 PROCEDURE — 77067 SCR MAMMO BI INCL CAD: CPT | Mod: TC,PO

## 2018-02-01 DIAGNOSIS — E03.4 HYPOTHYROIDISM DUE TO ACQUIRED ATROPHY OF THYROID: ICD-10-CM

## 2018-02-01 RX ORDER — LEVOTHYROXINE SODIUM 88 UG/1
TABLET ORAL
Qty: 90 TABLET | Refills: 3 | Status: SHIPPED | OUTPATIENT
Start: 2018-02-01 | End: 2019-01-20 | Stop reason: SDUPTHER

## 2018-02-27 ENCOUNTER — OFFICE VISIT (OUTPATIENT)
Dept: FAMILY MEDICINE | Facility: CLINIC | Age: 78
End: 2018-02-27
Payer: MEDICARE

## 2018-02-27 VITALS
WEIGHT: 151.44 LBS | BODY MASS INDEX: 25.85 KG/M2 | SYSTOLIC BLOOD PRESSURE: 118 MMHG | HEIGHT: 64 IN | HEART RATE: 72 BPM | RESPIRATION RATE: 18 BRPM | DIASTOLIC BLOOD PRESSURE: 70 MMHG

## 2018-02-27 DIAGNOSIS — N31.9 NEUROGENIC BLADDER: ICD-10-CM

## 2018-02-27 DIAGNOSIS — E78.5 DYSLIPIDEMIA: ICD-10-CM

## 2018-02-27 DIAGNOSIS — E03.4 HYPOTHYROIDISM DUE TO ACQUIRED ATROPHY OF THYROID: ICD-10-CM

## 2018-02-27 DIAGNOSIS — C67.9 MALIGNANT NEOPLASM OF URINARY BLADDER, UNSPECIFIED SITE: ICD-10-CM

## 2018-02-27 DIAGNOSIS — I73.00 RAYNAUD'S DISEASE WITHOUT GANGRENE: Primary | ICD-10-CM

## 2018-02-27 DIAGNOSIS — Z00.00 PREVENTATIVE HEALTH CARE: ICD-10-CM

## 2018-02-27 DIAGNOSIS — G25.0 ESSENTIAL TREMOR: ICD-10-CM

## 2018-02-27 PROCEDURE — 99999 PR PBB SHADOW E&M-EST. PATIENT-LVL III: CPT | Mod: PBBFAC,,, | Performed by: FAMILY MEDICINE

## 2018-02-27 PROCEDURE — 90732 PPSV23 VACC 2 YRS+ SUBQ/IM: CPT | Mod: S$GLB,,, | Performed by: FAMILY MEDICINE

## 2018-02-27 PROCEDURE — 99499 UNLISTED E&M SERVICE: CPT | Mod: S$GLB,,, | Performed by: FAMILY MEDICINE

## 2018-02-27 PROCEDURE — G0009 ADMIN PNEUMOCOCCAL VACCINE: HCPCS | Mod: S$GLB,,, | Performed by: FAMILY MEDICINE

## 2018-02-27 PROCEDURE — 99214 OFFICE O/P EST MOD 30 MIN: CPT | Mod: S$GLB,,, | Performed by: FAMILY MEDICINE

## 2018-02-27 RX ORDER — FLUTICASONE FUROATE AND VILANTEROL TRIFENATATE 100; 25 UG/1; UG/1
1 POWDER RESPIRATORY (INHALATION) DAILY
COMMUNITY
Start: 2018-01-23 | End: 2020-09-08 | Stop reason: SDUPTHER

## 2018-02-27 NOTE — PROGRESS NOTES
Subjective:       Patient ID: Jackie Landa is a 77 y.o. female.    Chief Complaint: Hand shaking (Numbness in finger)    HPI Comments:     C/o hand shaking when she is trying to write. This has been chronic but seems to be worse lately.  She is left-handed.    A few months ago she has also noticed her hands are changing colors.  They turn white and she gets numbness at the fingertips.  Hands feel cold regularly.    Hypothyroidism - Synthroid 88mcg daily  HLD - Zocor 40mg daily  Afib with pacemaker - tolerating Sotolol 40mg BID; seeing cardiology every 6 months with next appt next week  CAD - ASA 81mg daily  Depression - Wellbutrin XL 150mg daily; stable on this  GERD with hiatal hernia - uses Nexium PRN; uses tramadol PRN for pain  Bladder cancer/neurogenic bladder - oxybutinin daily; self cathing 5-6 times daily; following with Dr. Riojas and getting Botox injections      Past Medical History:    Bladder cancer                                                  Comment:s/p BCG, interferon; cancer free since 2006    Coronary artery disease                                       Hypothyroidism                                                COPD (chronic obstructive pulmonary disease)                    Comment:pt has hx of ARDS-hospitalized 1mos    A-fib                                                         HLD (hyperlipidemia)                                          Neurogenic bladder                                            Depression                                                    Hiatal hernia                                                 MVP (mitral valve prolapse)                                   ARDS (adult respiratory distress syndrome)      2000          Past Surgical History:    BREAST SURGERY                                   2001            Comment:right breast lumpectomy    FRACTURE SURGERY                                 2009            Comment:right ankle    APPENDECTOMY                                      1965          OVARIAN CYST REMOVAL                             1975            Comment:left and right    EYE SURGERY                                                    HERNIA REPAIR                                                  HYSTERECTOMY                                     1965          CARDIAC PACEMAKER PLACEMENT                      9/27/13       CORONARY ANGIOPLASTY WITH STENT PLACEMENT        2006            Comment:LAD    bladder stimulator                               2008          CATARACT EXTRACTION W/ INTRAOCULAR LENS  IMPLA*  2002          Allergies:   -- Codeine -- Itching   -- Phenergan (Promethazine) -- Itching   -- Darcalma (Gdcnwf-Wvtnw-Y.Blue-Sal-Sodium) -- Other (See Comments)    --  tachycardia   -- Levaquin (Levofloxacin) -- Rash and Other (See Comments)    --  headache   -- Pyridium (Phenazopyridine) -- Rash    Social History    Marital Status:             Spouse Name:                       Years of Education:                 Number of children: 4             Occupational History  Occupation          Employer            Comment                                                 Social History Main Topics    Smoking Status: Former Smoker                   Packs/Day: 0.00  Years: 30         Types: Cigarettes      Quit date: 08/14/1987    Smokeless Status: Never Used                        Alcohol Use: Yes                Comment: one daily    Drug Use: No           Social History Narrative    Will be living with her sister    Current Outpatient Prescriptions on File Prior to Visit:  aspirin (ECOTRIN) 81 MG EC tablet, Take 81 mg by mouth once daily., Disp: , Rfl:   levothyroxine (SYNTHROID) 88 MCG tablet, Take 1 tablet (88 mcg total) by mouth once daily., Disp: 90 tablet, Rfl: 6  oxybutynin (DITROPAN XL) 15 MG TR24, Take 1 tablet (15 mg total) by mouth once daily., Disp: 90 tablet, Rfl: 6  simvastatin (ZOCOR) 40 MG tablet, Take 1 tablet (40 mg total) by mouth  "every evening., Disp: 90 tablet, Rfl: 6  sotalol (BETAPACE) 80 MG tablet, Take 0.5 tablets (40 mg total) by mouth 2 (two) times daily., Disp: 180 tablet, Rfl: 6  (DISCONTINUED) buPROPion (WELLBUTRIN XL) 150 MG TB24 tablet, Take 1 tablet (150 mg total) by mouth once daily. (Patient taking differently: Take 300 mg by mouth once daily. ), Disp: 90 tablet, Rfl: 6  nitroGLYCERIN (NITROSTAT) 0.4 MG SL tablet, Place 1 tablet (0.4 mg total) under the tongue every 5 (five) minutes as needed for Chest pain., Disp: 30 tablet, Rfl: 12  (DISCONTINUED) solifenacin (VESICARE) 10 MG tablet, Take 1 tablet (10 mg total) by mouth once daily. Takes 1.5 tablets daily, Disp: 180 tablet, Rfl: 6    No current facility-administered medications on file prior to visit.      Review of patient's family history indicates:    Cancer                         Father                      Comment: adrenal    Cancer                         Paternal Aunt               Comment: breast      Prevent:  Colonoscopy UTD  FLU UTD  Zostavax done  Pneumovax done    Review of Systems   Constitutional: Negative for fever, chills, appetite change and unexpected weight change.   HENT: Negative for sore throat and trouble swallowing.    Eyes: Negative for pain and visual disturbance.   Respiratory: Negative for cough, shortness of breath and wheezing.    Cardiovascular: Negative for chest pain and palpitations.   Gastrointestinal: Negative for nausea, vomiting, abdominal pain, diarrhea and blood in stool.   Genitourinary: Negative for dysuria, hematuria and difficulty urinating.   Musculoskeletal: Negative for arthralgias, gait problem and neck pain.   Skin: Negative for rash and wound.   Neurological: Negative for dizziness, weakness, numbness and headaches. +tremor  Hematological: Negative for adenopathy.   Psychiatric/Behavioral: Negative for dysphoric mood.       Objective:     /70   Pulse 72   Resp 18   Ht 5' 4" (1.626 m)   Wt 68.7 kg (151 lb 7.3 oz)  "  LMP  (LMP Unknown)   BMI 26.00 kg/m²     Physical Exam   Constitutional: She is oriented to person, place, and time. She appears well-developed and well-nourished.   HENT:   Head: Normocephalic.   Mouth/Throat: Oropharynx is clear and moist. No oropharyngeal exudate or posterior oropharyngeal erythema.   Eyes: Conjunctivae and EOM are normal. Pupils are equal, round, and reactive to light.   Neck: Normal range of motion. Neck supple. No thyromegaly present.   Cardiovascular: Normal rate, regular rhythm, S1 normal, S2 normal, normal heart sounds and intact distal pulses.  Exam reveals no gallop and no friction rub.    No murmur heard.  Pulmonary/Chest: Effort normal and breath sounds normal. She has no wheezes. She has no rales.   Abdominal: Normal appearance.   Musculoskeletal:        Right lower leg: She exhibits no edema.        Left lower leg: She exhibits no edema.   Lymphadenopathy:     She has no cervical adenopathy.   Neurological: She is alert and oriented to person, place, and time. No cranial nerve deficit. Gait normal. mild left hand tremor; coordination normal  Skin: Skin is warm and intact. No rash noted.   Psychiatric: She has a normal mood and affect.       Results for orders placed or performed during the hospital encounter of 10/10/17   CBC Without Differential   Result Value Ref Range    WBC 4.87 3.90 - 12.70 K/uL    RBC 3.95 (L) 4.00 - 5.40 M/uL    Hemoglobin 12.6 12.0 - 16.0 g/dL    Hematocrit 40.5 37.0 - 48.5 %     (H) 82 - 98 fL    MCH 31.9 (H) 27.0 - 31.0 pg    MCHC 31.1 (L) 32.0 - 36.0 g/dL    RDW 13.0 11.5 - 14.5 %    Platelets 177 150 - 350 K/uL    MPV 9.8 9.2 - 12.9 fL   Protime-INR   Result Value Ref Range    PT 13.7 11.8 - 14.7 sec    INR 1.1    APTT   Result Value Ref Range    aPTT 39.1 (H) 24.6 - 36.7 sec         Assessment:       1. Raynaud's disease without gangrene    2. Essential tremor    3. Preventative health care    4. Hypothyroidism due to acquired atrophy of thyroid     5. Dyslipidemia    6. Malignant neoplasm of urinary bladder, unspecified site    7. Neurogenic bladder        Plan:       Raynaud's disease without gangrene    Essential tremor    Preventative health care  -     (In Office Administered) Pneumococcal Polysaccharide Vaccine (23 Valent) (SQ/IM)    Hypothyroidism due to acquired atrophy of thyroid    Dyslipidemia    Malignant neoplasm of urinary bladder, unspecified site    Neurogenic bladder          Reassurance  Warm hand when needed  Labs soon as planned  Continue sotolol 40mg BID  Counseled on regular exercise, maintenance of a healthy weight, balanced diet rich in fruits/vegetables and lean protein, and avoidance of unhealthy habits like smoking and excessive alcohol intake.  Continue present meds and speciality follow-up  F/u with labs in 12 months

## 2018-02-28 PROBLEM — R05.3 PERSISTENT COUGH: Status: RESOLVED | Noted: 2017-02-25 | Resolved: 2018-02-28

## 2018-03-19 ENCOUNTER — CLINICAL SUPPORT (OUTPATIENT)
Dept: CARDIOLOGY | Facility: CLINIC | Age: 78
End: 2018-03-19
Attending: INTERNAL MEDICINE
Payer: MEDICARE

## 2018-03-19 DIAGNOSIS — Z95.0 CARDIAC PACEMAKER IN SITU: Primary | ICD-10-CM

## 2018-03-19 DIAGNOSIS — R00.1 BRADYCARDIA: ICD-10-CM

## 2018-03-19 DIAGNOSIS — Z95.0 CARDIAC PACEMAKER IN SITU: ICD-10-CM

## 2018-03-19 PROCEDURE — 93296 REM INTERROG EVL PM/IDS: CPT | Mod: S$GLB,,, | Performed by: INTERNAL MEDICINE

## 2018-03-19 PROCEDURE — 93294 REM INTERROG EVL PM/LDLS PM: CPT | Mod: S$GLB,,, | Performed by: INTERNAL MEDICINE

## 2018-04-20 ENCOUNTER — OFFICE VISIT (OUTPATIENT)
Dept: CARDIOLOGY | Facility: CLINIC | Age: 78
End: 2018-04-20
Payer: MEDICARE

## 2018-04-20 VITALS
DIASTOLIC BLOOD PRESSURE: 73 MMHG | HEART RATE: 60 BPM | BODY MASS INDEX: 26.05 KG/M2 | HEIGHT: 64 IN | WEIGHT: 152.56 LBS | SYSTOLIC BLOOD PRESSURE: 139 MMHG

## 2018-04-20 DIAGNOSIS — I25.10 CORONARY ARTERY DISEASE INVOLVING NATIVE CORONARY ARTERY OF NATIVE HEART WITHOUT ANGINA PECTORIS: ICD-10-CM

## 2018-04-20 DIAGNOSIS — Z91.89 AT RISK FOR CORONARY ARTERY DISEASE: ICD-10-CM

## 2018-04-20 DIAGNOSIS — Z95.5 S/P CORONARY ARTERY STENT PLACEMENT: Primary | ICD-10-CM

## 2018-04-20 DIAGNOSIS — I48.0 PAF (PAROXYSMAL ATRIAL FIBRILLATION): Chronic | ICD-10-CM

## 2018-04-20 DIAGNOSIS — E78.5 DYSLIPIDEMIA: ICD-10-CM

## 2018-04-20 DIAGNOSIS — Z95.0 CARDIAC PACEMAKER IN SITU: Chronic | ICD-10-CM

## 2018-04-20 PROCEDURE — 99499 UNLISTED E&M SERVICE: CPT | Mod: S$GLB,,, | Performed by: INTERNAL MEDICINE

## 2018-04-20 PROCEDURE — 99214 OFFICE O/P EST MOD 30 MIN: CPT | Mod: S$GLB,,, | Performed by: INTERNAL MEDICINE

## 2018-04-20 PROCEDURE — 99999 PR PBB SHADOW E&M-EST. PATIENT-LVL III: CPT | Mod: PBBFAC,,, | Performed by: INTERNAL MEDICINE

## 2018-04-20 RX ORDER — SOTALOL HYDROCHLORIDE 80 MG/1
TABLET ORAL
Qty: 180 TABLET | Refills: 4 | Status: SHIPPED | OUTPATIENT
Start: 2018-04-20 | End: 2018-04-20 | Stop reason: SDUPTHER

## 2018-04-20 RX ORDER — SOTALOL HYDROCHLORIDE 80 MG/1
TABLET ORAL
Qty: 180 TABLET | Refills: 4 | Status: SHIPPED | OUTPATIENT
Start: 2018-04-20 | End: 2019-07-19 | Stop reason: SDUPTHER

## 2018-04-20 RX ORDER — SIMVASTATIN 40 MG/1
40 TABLET, FILM COATED ORAL NIGHTLY
Qty: 90 TABLET | Refills: 3 | Status: SHIPPED | OUTPATIENT
Start: 2018-04-20 | End: 2019-07-13 | Stop reason: SDUPTHER

## 2018-04-20 NOTE — PROGRESS NOTES
Subjective:    Patient ID:  Jackie Landa is a 77 y.o. female who presents for follow-up of No chief complaint on file.      HPI  Here for follow up of CAD-PCI (2006)/PPM.  Occasional mild orthostatic hypotension. Patient denies palpitations, syncope, presyncope, lightheadedness or dizziness. Patient is exercising regularly with out symptoms. C/o occasional CP lasting 10 min w/o exertional. Admits to increasing GORDON.      Review of Systems   Constitution: Negative for malaise/fatigue.   Eyes: Negative for blurred vision.   Cardiovascular: Negative for chest pain, claudication, cyanosis, dyspnea on exertion, irregular heartbeat, leg swelling, near-syncope, orthopnea, palpitations, paroxysmal nocturnal dyspnea and syncope.   Respiratory: Negative for cough and shortness of breath.    Hematologic/Lymphatic: Does not bruise/bleed easily.   Musculoskeletal: Negative for back pain, falls, joint pain, muscle cramps, muscle weakness and myalgias.   Gastrointestinal: Negative for abdominal pain, change in bowel habit, nausea and vomiting.   Genitourinary: Negative for urgency.   Neurological: Negative for dizziness, focal weakness and light-headedness.        Objective:    Physical Exam   Constitutional: She is oriented to person, place, and time. She appears well-developed and well-nourished.   HENT:   Head: Normocephalic.   Eyes: Conjunctivae are normal.   Neck: Normal range of motion. Neck supple. No JVD present.   Cardiovascular: Normal rate, regular rhythm, normal heart sounds and intact distal pulses.    Pulses:       Carotid pulses are 2+ on the right side, and 2+ on the left side.       Radial pulses are 2+ on the right side, and 2+ on the left side.        Dorsalis pedis pulses are 2+ on the right side, and 2+ on the left side.        Posterior tibial pulses are 2+ on the right side, and 2+ on the left side.   Pacer site clean and dry, well healed.     Pulmonary/Chest: Effort normal and breath sounds normal.    Abdominal: Soft. Bowel sounds are normal.   Musculoskeletal: She exhibits no edema or tenderness.   Neurological: She is alert and oriented to person, place, and time. Gait normal.   Skin: Skin is warm, dry and intact. No cyanosis. Nails show no clubbing.   Psychiatric: She has a normal mood and affect. Her speech is normal and behavior is normal. Thought content normal.             ..    Chemistry        Component Value Date/Time     02/25/2017 0844    K 4.2 02/25/2017 0844     02/25/2017 0844    CO2 30 02/25/2017 0844    BUN 15 02/25/2017 0844    CREATININE 0.91 02/25/2017 0844    GLU 99 02/25/2017 0844        Component Value Date/Time    CALCIUM 8.9 02/25/2017 0844    ALKPHOS 106 02/25/2017 0844    AST 37 (H) 02/25/2017 0844    ALT 49 (H) 02/25/2017 0844    BILITOT 1.2 02/25/2017 0844    ESTGFRAFRICA >60 02/25/2017 0844    EGFRNONAA >60 02/25/2017 0844            ..  Lab Results   Component Value Date    CHOL 164 08/27/2016    CHOL 148 12/01/2015    CHOL 161 08/03/2015     Lab Results   Component Value Date    HDL 66 08/27/2016    HDL 59 12/01/2015    HDL 64 08/03/2015     Lab Results   Component Value Date    LDLCALC 69.8 08/27/2016    LDLCALC 69.4 12/01/2015    LDLCALC 78.8 08/03/2015     Lab Results   Component Value Date    TRIG 141 08/27/2016    TRIG 98 12/01/2015    TRIG 91 08/03/2015     Lab Results   Component Value Date    CHOLHDL 40.2 08/27/2016    CHOLHDL 39.9 12/01/2015    CHOLHDL 39.8 08/03/2015     ..  Lab Results   Component Value Date    WBC 4.87 10/10/2017    HGB 12.6 10/10/2017    HCT 40.5 10/10/2017     (H) 10/10/2017     10/10/2017       Test(s) Reviewed  I have reviewed the following in detail:  [] Stress test   [] Angiography   [] Echocardiogram   [x] Labs   [] Other:       Assessment:         ICD-10-CM ICD-9-CM   1. S/P coronary artery stent placement - LAD 2006; Patent 09/2012 Z95.5 V45.82   2. Dyslipidemia E78.5 272.4   3. Cardiac pacemaker in situ Z95.0 V45.01    4. Coronary artery disease involving native coronary artery of native heart without angina pectoris I25.10 414.01   5. PAF (paroxysmal atrial fibrillation) I48.0 427.31     Problem List Items Addressed This Visit     CAD (coronary artery disease), native coronary artery    Overview     9/2016  [facility]  Mild, moderate stenosis. Pre and post-stent of the LAD verified by FFR.  Normal diastolic function and normal systolic function.         Cardiac pacemaker in situ (Chronic)    Overview     10/13 [employer] D[employer]           Dyslipidemia    PAF (paroxysmal atrial fibrillation) (Chronic)    Overview     On sotalol         S/P coronary artery stent placement - LAD 2006; Patent 09/2012 - Primary    Overview     2012 angio Patent LAD stent with 50-60 post stent dsx FFR 0.88                Plan:           Return to clinic 4 months   Low level/low impact aerobic exercise 5x's/wk. Heart healthy diet and risk factor modification.    See labs and med orders.  [email or web address]

## 2018-05-16 ENCOUNTER — HOSPITAL ENCOUNTER (OUTPATIENT)
Dept: RADIOLOGY | Facility: HOSPITAL | Age: 78
Discharge: HOME | End: 2018-05-16
Attending: INTERNAL MEDICINE
Payer: MEDICARE

## 2018-05-16 DIAGNOSIS — J20.9 ACUTE EXACERBATION OF CHRONIC BRONCHITIS: ICD-10-CM

## 2018-05-16 DIAGNOSIS — J98.4 DISEASE OF LUNG: ICD-10-CM

## 2018-05-16 DIAGNOSIS — J42 ACUTE EXACERBATION OF CHRONIC BRONCHITIS: ICD-10-CM

## 2018-05-16 PROCEDURE — 71046 X-RAY EXAM CHEST 2 VIEWS: CPT | Mod: TC,FY,PO

## 2018-05-16 PROCEDURE — 71046 X-RAY EXAM CHEST 2 VIEWS: CPT | Mod: 26,,, | Performed by: RADIOLOGY

## 2018-05-18 ENCOUNTER — CLINICAL SUPPORT (OUTPATIENT)
Dept: CARDIOLOGY | Facility: CLINIC | Age: 78
End: 2018-05-18
Attending: INTERNAL MEDICINE
Payer: MEDICARE

## 2018-05-18 ENCOUNTER — HOSPITAL ENCOUNTER (OUTPATIENT)
Dept: RADIOLOGY | Facility: HOSPITAL | Age: 78
Discharge: HOME | End: 2018-05-18
Attending: FAMILY MEDICINE
Payer: MEDICARE

## 2018-05-18 DIAGNOSIS — Z95.0 CARDIAC PACEMAKER IN SITU: Chronic | ICD-10-CM

## 2018-05-18 DIAGNOSIS — E78.5 DYSLIPIDEMIA: ICD-10-CM

## 2018-05-18 DIAGNOSIS — R07.9 CHEST PAIN, UNSPECIFIED TYPE: ICD-10-CM

## 2018-05-18 DIAGNOSIS — I35.9 NONRHEUMATIC AORTIC VALVE DISORDER: ICD-10-CM

## 2018-05-18 DIAGNOSIS — I25.10 CORONARY ARTERY DISEASE INVOLVING NATIVE CORONARY ARTERY OF NATIVE HEART WITHOUT ANGINA PECTORIS: ICD-10-CM

## 2018-05-18 DIAGNOSIS — Z91.89 AT RISK FOR CORONARY ARTERY DISEASE: ICD-10-CM

## 2018-05-18 DIAGNOSIS — Z95.5 S/P CORONARY ARTERY STENT PLACEMENT: ICD-10-CM

## 2018-05-18 LAB
AORTIC VALVE REGURGITATION: ABNORMAL
DIASTOLIC DYSFUNCTION: NO
DIASTOLIC DYSFUNCTION: NO
ESTIMATED PA SYSTOLIC PRESSURE: 36.64
RETIRED EF AND QEF - SEE NOTES: 55 (ref 55–65)
TRICUSPID VALVE REGURGITATION: ABNORMAL

## 2018-05-18 PROCEDURE — 93015 CV STRESS TEST SUPVJ I&R: CPT | Mod: S$GLB,,, | Performed by: INTERNAL MEDICINE

## 2018-05-18 PROCEDURE — 93000 ELECTROCARDIOGRAM COMPLETE: CPT | Mod: S$GLB,,, | Performed by: INTERNAL MEDICINE

## 2018-05-18 PROCEDURE — 78452 HT MUSCLE IMAGE SPECT MULT: CPT | Mod: 26,,, | Performed by: INTERNAL MEDICINE

## 2018-05-18 PROCEDURE — 93306 TTE W/DOPPLER COMPLETE: CPT | Mod: S$GLB,,, | Performed by: INTERNAL MEDICINE

## 2018-05-21 ENCOUNTER — TELEPHONE (OUTPATIENT)
Dept: CARDIOLOGY | Facility: CLINIC | Age: 78
End: 2018-05-21

## 2018-05-21 DIAGNOSIS — R07.9 CHEST PAIN, UNSPECIFIED TYPE: Primary | ICD-10-CM

## 2018-05-21 NOTE — TELEPHONE ENCOUNTER
----- Message from Ofelia Cerrato sent at 5/21/2018 10:11 AM CDT -----  Regarding: NEED ekg order  EKG done    5/18              .   Please place the order.

## 2018-05-22 ENCOUNTER — TELEPHONE (OUTPATIENT)
Dept: CARDIOLOGY | Facility: CLINIC | Age: 78
End: 2018-05-22

## 2018-05-22 ENCOUNTER — PATIENT MESSAGE (OUTPATIENT)
Dept: CARDIOLOGY | Facility: CLINIC | Age: 78
End: 2018-05-22

## 2018-05-22 NOTE — TELEPHONE ENCOUNTER
----- Message from Jennifer Davenport sent at 5/22/2018  7:56 AM CDT -----  Type:  Test Results    Who Called:  pt  Name of Test (Lab/Mammo/Etc):  na  Date of Test: na  Ordering Provider: arti  Where the test was performed:  [facility]  Best Call Back Number:[phone]  Additional Information:  Discuss  Test results

## 2018-06-18 ENCOUNTER — CLINICAL SUPPORT (OUTPATIENT)
Dept: CARDIOLOGY | Facility: CLINIC | Age: 78
End: 2018-06-18
Attending: INTERNAL MEDICINE
Payer: MEDICARE

## 2018-06-18 DIAGNOSIS — Z95.0 CARDIAC PACEMAKER IN SITU: Primary | ICD-10-CM

## 2018-06-18 DIAGNOSIS — R00.1 BRADYCARDIA: ICD-10-CM

## 2018-06-18 DIAGNOSIS — Z95.0 CARDIAC PACEMAKER IN SITU: ICD-10-CM

## 2018-06-18 PROCEDURE — 93296 REM INTERROG EVL PM/IDS: CPT | Mod: S$GLB,,, | Performed by: INTERNAL MEDICINE

## 2018-06-18 PROCEDURE — 93294 REM INTERROG EVL PM/LDLS PM: CPT | Mod: S$GLB,,, | Performed by: INTERNAL MEDICINE

## 2018-06-25 RX ORDER — OXYBUTYNIN CHLORIDE 15 MG/1
TABLET, EXTENDED RELEASE ORAL
Qty: 90 TABLET | Refills: 1 | Status: SHIPPED | OUTPATIENT
Start: 2018-06-25 | End: 2019-01-13 | Stop reason: SDUPTHER

## 2018-07-04 LAB
AV DELAY - LONGEST: 260 MSEC
AV DELAY - SHORTEST: 150 MSEC
IMPEDANCE RA LEAD (NATIVE): 819 OHMS
IMPEDANCE RA LEAD: 603 OHMS
OHS CV DC PP MS1: 0.4 MS
OHS CV DC PP MS2: 0.4 MS
OHS CV DC PP V1: 2 V
OHS CV DC PP V2: 2 V
P/R-WAVE RA LEAD (NATIVE): 14.4 MV
PV DELAY - LONGEST: 260 MSEC
PV DELAY - SHORTEST: 150 MSEC

## 2018-07-20 ENCOUNTER — TELEPHONE (OUTPATIENT)
Dept: DERMATOLOGY | Facility: CLINIC | Age: 78
End: 2018-07-20

## 2018-07-20 NOTE — TELEPHONE ENCOUNTER
----- Message from Kenia Kimble sent at 7/20/2018 12:58 PM CDT -----  Contact: Jackie  Patient is calling as Dr Morales is referring for pre-cancerous spot on left ear and bottom lip; was treated. States referral was sent to office. Please call [phone]. Thanks!

## 2018-07-24 ENCOUNTER — TELEPHONE (OUTPATIENT)
Dept: DERMATOLOGY | Facility: CLINIC | Age: 78
End: 2018-07-24

## 2018-07-29 RX ORDER — TRAMADOL HYDROCHLORIDE 50 MG/1
TABLET ORAL
Qty: 30 TABLET | Refills: 0 | Status: SHIPPED | OUTPATIENT
Start: 2018-07-29 | End: 2019-01-12 | Stop reason: SDUPTHER

## 2018-09-12 ENCOUNTER — OFFICE VISIT (OUTPATIENT)
Dept: FAMILY MEDICINE | Facility: CLINIC | Age: 78
End: 2018-09-12
Payer: MEDICARE

## 2018-09-12 VITALS
DIASTOLIC BLOOD PRESSURE: 70 MMHG | WEIGHT: 147.25 LBS | BODY MASS INDEX: 25.14 KG/M2 | HEART RATE: 62 BPM | OXYGEN SATURATION: 96 % | TEMPERATURE: 98 F | SYSTOLIC BLOOD PRESSURE: 124 MMHG | HEIGHT: 64 IN

## 2018-09-12 DIAGNOSIS — L01.00 IMPETIGO: Primary | ICD-10-CM

## 2018-09-12 PROCEDURE — 99999 PR PBB SHADOW E&M-EST. PATIENT-LVL IV: CPT | Mod: PBBFAC,,, | Performed by: FAMILY MEDICINE

## 2018-09-12 PROCEDURE — 99214 OFFICE O/P EST MOD 30 MIN: CPT | Mod: PBBFAC,PO | Performed by: FAMILY MEDICINE

## 2018-09-12 PROCEDURE — 1101F PT FALLS ASSESS-DOCD LE1/YR: CPT | Mod: CPTII,,, | Performed by: FAMILY MEDICINE

## 2018-09-12 PROCEDURE — 99213 OFFICE O/P EST LOW 20 MIN: CPT | Mod: S$PBB,,, | Performed by: FAMILY MEDICINE

## 2018-09-12 RX ORDER — KETOCONAZOLE 20 MG/G
CREAM TOPICAL
Refills: 0 | COMMUNITY
Start: 2018-09-08 | End: 2018-09-12

## 2018-09-12 RX ORDER — MUPIROCIN 20 MG/G
OINTMENT TOPICAL
Refills: 1 | Status: ON HOLD | COMMUNITY
Start: 2018-09-08 | End: 2019-01-28

## 2018-09-12 RX ORDER — CEPHALEXIN 500 MG/1
500 CAPSULE ORAL EVERY 8 HOURS
Qty: 21 CAPSULE | Refills: 0 | Status: SHIPPED | OUTPATIENT
Start: 2018-09-12 | End: 2018-12-27 | Stop reason: SDUPTHER

## 2018-09-12 RX ORDER — LIDOCAINE HYDROCHLORIDE 20 MG/ML
SOLUTION ORAL; TOPICAL
Refills: 0 | COMMUNITY
Start: 2018-08-24 | End: 2019-01-22

## 2018-09-12 NOTE — PROGRESS NOTES
Subjective:       Patient ID: Jackie Landa is a 78 y.o. female.    Chief Complaint: Rash around mouth    Initially with sore on lip that was biopsied 4 weeks ago by Dr. Mejia.      She began to develop chapped lips and rash around the lips.  She went to [facility] and was treated with antibiotic.  It improved and then began to get worse.    She went back to the [facility] and was prescribed Bactoban and ketoconazole 10 days ago.    Rash around lips seems worse today.    She has apt with derm next Wednesday.          Past Medical History:   Diagnosis Date    A-fib     ARDS (adult respiratory distress syndrome) 2000    Bladder cancer     s/p BCG, interferon; cancer free since 1999, had twice, 1994 first    Coronary artery disease     9/2016-Mild, moderate stenosis. Pre and post-stent of the LAD verified by FFR.    Depression     Difficult intubation 10/2017    Had trauma and bruising to her mouth and tongue last intubation at [facility]    GERD (gastroesophageal reflux disease)     Hiatal hernia     HLD (hyperlipidemia)     Hypothyroidism     Intermittent self-catheterization of bladder     MVP (mitral valve prolapse)     Neurogenic bladder        Past Surgical History:   Procedure Laterality Date    APPENDECTOMY  1965    bladder stimulator  2008    not working    BREAST BIOPSY Right 2001    benign    CARDIAC CATHETERIZATION  09/02/2016    CARDIAC PACEMAKER PLACEMENT  9/27/13    CARDIAC PACEMAKER PLACEMENT  09/27/2013    CATARACT EXTRACTION W/ INTRAOCULAR LENS  IMPLANT, BILATERAL  2002    COLONOSCOPY      CORONARY ANGIOPLASTY WITH STENT PLACEMENT  2006    LAD    CYSTOSCOPY N/A 4/26/2018    Performed by Ben Sigala [facility] at [facility] OR    CYSTOSCOPY; Botox Injection N/A 10/10/2017    Performed by Reinier Vallejo MD at [facility] OR    ESOPHAGOGASTRODUODENOSCOPY      EYE SURGERY Bilateral     FRACTURE SURGERY  2009    right ankle with hardware    HEART CATH-LEFT Left 9/2/2016    [facility] CATH    HYSTERECTOMY  1965    INJECTION-BOTOX N/A 2018    Performed by Ben [facility]ala [facility] OR    INSERTION, CARDIAC PACEMAKER, DUAL CHAMBER N/A 2013    Performed by Hebert Marquez [facility] at [facility] CATH LAB    OVARIAN CYST REMOVAL      left and right       Review of patient's allergies indicates:   Allergen Reactions    Codeine Itching    Phenergan [promethazine] Itching    Bactrim [sulfamethoxazole-trimethoprim] Hives    Darcalma [nmnsin-ltgnw-w.blue-sal-sodium] Other (See Comments)     tachycardia    Levaquin [levofloxacin] Rash and Other (See Comments)     headache    Pyridium [phenazopyridine] Rash       Social History     Socioeconomic History    Marital status:      Spouse name: Not on file    Number of children: 4    Years of education: Not on file    Highest education level: Not on file   Social Needs    Financial resource strain: Not on file    Food insecurity - worry: Not on file    Food insecurity - inability: Not on file    Transportation needs - medical: Not on file    Transportation needs - non-medical: Not on file   Occupational History    Occupation:    Tobacco Use    Smoking status: Former Smoker     Years: 30.00     Types: Cigarettes     Last attempt to quit: 1987     Years since quittin.1    Smokeless tobacco: Never Used   Substance and Sexual Activity    Alcohol use: Yes     Alcohol/week: 0.6 oz     Types: 1 Glasses of wine per week     Comment: one daily    Drug use: No    Sexual activity: Not on file   Other Topics Concern    Not on file   Social History Narrative    living with her sister        Enjoys RV           Current Outpatient Medications on File Prior to Visit   Medication Sig Dispense Refill    acetaminophen (TYLENOL) 325 MG tablet Take 1 tablet (325 mg total) by mouth every 6 (six) hours as needed for Pain.  0    albuterol (VENTOLIN HFA) 90 mcg/actuation inhaler INHALE 2 PUFFS INTO THE LUNGS EVERY 4  HOURS AS NEEDED FOR WHEEZING OR SHORTNESS OF BREATH (Patient taking differently: Inhale 1 puff into the lungs once daily. INHALE 2 PUFFS INTO THE LUNGS EVERY 4 HOURS AS NEEDED FOR WHEEZING OR SHORTNESS OF BREATH) 1 Inhaler 11    aspirin (ECOTRIN) 81 MG EC tablet Take 81 mg by mouth once daily.      BREO ELLIPTA 100-25 mcg/dose diskus inhaler Inhale 1 puff into the lungs once daily.       buPROPion (WELLBUTRIN XL) 150 MG TB24 tablet Take 150 mg by mouth once daily.      levothyroxine (SYNTHROID) 88 MCG tablet TAKE 1 TABLET EVERY DAY 90 tablet 3    LIDOCAINE VISCOUS 2 % solution TAKE 15 ML SWISHED INN THE MOUTH AND SPIT OUT NO MORE FREQUENTLY THAN EVERY 3 HOURS  0    multivitamin (ONE DAILY MULTIVITAMIN) per tablet Take 1 tablet by mouth once daily.      mupirocin (BACTROBAN) 2 % ointment APPLY TWICE A DAY FOR 7-10 DAYS  1    omeprazole (PRILOSEC) 20 MG capsule Take 20 mg by mouth daily as needed.      oxybutynin (DITROPAN XL) 15 MG TR24 TAKE 1 TABLET DAILY 90 tablet 1    simvastatin (ZOCOR) 40 MG tablet Take 1 tablet (40 mg total) by mouth every evening. 90 tablet 3    sotalol (BETAPACE) 80 MG tablet 40 mg BID (Patient taking differently: Take 40 mg by mouth 2 (two) times daily. 40 mg BID) 180 tablet 4    traMADol (ULTRAM) 50 mg tablet TAKE 1 TABLET BY MOUTH EVERY 6 HOURS AS NEEDED FOR PAIN, MAY CAUSE SEDATION 30 tablet 0    [DISCONTINUED] ketoconazole (NIZORAL) 2 % cream APPLY ONCE DAILY FOR 2 WEEKS  0    nitroGLYCERIN (NITROSTAT) 0.4 MG SL tablet Place 1 tablet (0.4 mg total) under the tongue every 5 (five) minutes as needed for Chest pain. 25 tablet 11    [DISCONTINUED] sod chlor-bicarb-squeez bottle (NEILMED SINUS RINSE COMPLETE) pkdv 1 packet by Nasal route 4 (four) times daily. (Patient taking differently: 1 packet by Nasal route daily as needed. ) 25 each      No current facility-administered medications on file prior to visit.        Family History   Problem Relation Age of Onset    Cancer  "Father         adrenal    Cancer Paternal Aunt         breast    Breast cancer Paternal Aunt 65    Heart disease Mother     Breast cancer Paternal Grandmother 65       Review of Systems   Constitutional: Negative for appetite change, chills, fever and unexpected weight change.   HENT: Negative for sore throat and trouble swallowing.    Eyes: Negative for pain and visual disturbance.   Respiratory: Negative for cough, shortness of breath and wheezing.    Cardiovascular: Negative for chest pain and palpitations.   Gastrointestinal: Negative for abdominal pain, blood in stool, diarrhea, nausea and vomiting.   Genitourinary: Negative for difficulty urinating, dysuria and hematuria.   Musculoskeletal: Negative for arthralgias, gait problem and neck pain.   Skin: Positive for rash. Negative for wound.   Neurological: Negative for dizziness, weakness, numbness and headaches.   Hematological: Negative for adenopathy.   Psychiatric/Behavioral: Negative for dysphoric mood.       Objective:      /70   Pulse 62   Temp 98.2 °F (36.8 °C) (Oral)   Ht 5' 4" (1.626 m)   Wt 66.8 kg (147 lb 4.3 oz)   LMP  (LMP Unknown)   SpO2 96%   BMI 25.28 kg/m²   Physical Exam   Constitutional: She is oriented to person, place, and time. She appears well-developed and well-nourished.   HENT:   Head: Normocephalic.   Mouth/Throat: Oropharynx is clear and moist. No oropharyngeal exudate or posterior oropharyngeal erythema.   Eyes: Conjunctivae and EOM are normal. Pupils are equal, round, and reactive to light.   Neck: Normal range of motion. Neck supple. No thyromegaly present.   Cardiovascular: Normal rate, regular rhythm, S1 normal and S2 normal.   Pulmonary/Chest: Effort normal.   Abdominal: Normal appearance.   Musculoskeletal:        Right lower leg: She exhibits no edema.        Left lower leg: She exhibits no edema.   Lymphadenopathy:     She has no cervical adenopathy.   Neurological: She is alert and oriented to person, " place, and time. No cranial nerve deficit. Gait normal.   Skin: Skin is warm and intact. Rash noted.   Psychiatric: She has a normal mood and affect.             Assessment:       1. Impetigo        Plan:       Impetigo  -     cephALEXin (KEFLEX) 500 MG capsule; Take 1 capsule (500 mg total) by mouth every 8 (eight) hours. for 7 days  Dispense: 21 capsule; Refill: 0        Continue topical bactroban TID  F/u with Dr. Moura next week if not improving

## 2018-09-18 ENCOUNTER — TELEPHONE (OUTPATIENT)
Dept: FAMILY MEDICINE | Facility: CLINIC | Age: 78
End: 2018-09-18

## 2018-09-18 NOTE — TELEPHONE ENCOUNTER
----- Message from Kody Molina sent at 9/18/2018  9:38 AM CDT -----  Type: Needs Medical Advice    Who Called:  Self   Symptoms (please be specific):  NA How long has patient had these symptoms:  Pharmacy name and phone #:  NA Best Call Back Number: [phone]  Additional Information: Patient asking for the picture,that was taken of the patient's lip to be faxed to dermatologist Dr Scottie Moura office- Fax   [phone]. Patient is schedule tomorrow to see the dermatologist.

## 2018-09-18 NOTE — TELEPHONE ENCOUNTER
Continuation of prior message. Closed in error. Lenka requesting picture of face taken during last appointment faxed to Dr. Moura's office. She says her face is improving. Faxed over picture.

## 2018-09-20 ENCOUNTER — OFFICE VISIT (OUTPATIENT)
Dept: CARDIOLOGY | Facility: CLINIC | Age: 78
End: 2018-09-20
Payer: MEDICARE

## 2018-09-20 ENCOUNTER — PATIENT MESSAGE (OUTPATIENT)
Dept: FAMILY MEDICINE | Facility: CLINIC | Age: 78
End: 2018-09-20

## 2018-09-20 VITALS
BODY MASS INDEX: 24.99 KG/M2 | HEIGHT: 64 IN | SYSTOLIC BLOOD PRESSURE: 141 MMHG | DIASTOLIC BLOOD PRESSURE: 77 MMHG | WEIGHT: 146.38 LBS | HEART RATE: 72 BPM

## 2018-09-20 DIAGNOSIS — Z95.0 CARDIAC PACEMAKER IN SITU: Chronic | ICD-10-CM

## 2018-09-20 DIAGNOSIS — E78.5 DYSLIPIDEMIA: ICD-10-CM

## 2018-09-20 DIAGNOSIS — I25.10 CORONARY ARTERY DISEASE INVOLVING NATIVE CORONARY ARTERY OF NATIVE HEART WITHOUT ANGINA PECTORIS: ICD-10-CM

## 2018-09-20 DIAGNOSIS — Z95.5 S/P CORONARY ARTERY STENT PLACEMENT: Primary | ICD-10-CM

## 2018-09-20 DIAGNOSIS — I48.0 PAF (PAROXYSMAL ATRIAL FIBRILLATION): Chronic | ICD-10-CM

## 2018-09-20 PROCEDURE — 99213 OFFICE O/P EST LOW 20 MIN: CPT | Mod: PBBFAC,PO | Performed by: INTERNAL MEDICINE

## 2018-09-20 PROCEDURE — 1101F PT FALLS ASSESS-DOCD LE1/YR: CPT | Mod: CPTII,,, | Performed by: INTERNAL MEDICINE

## 2018-09-20 PROCEDURE — 99999 PR PBB SHADOW E&M-EST. PATIENT-LVL III: CPT | Mod: PBBFAC,,, | Performed by: INTERNAL MEDICINE

## 2018-09-20 PROCEDURE — 99214 OFFICE O/P EST MOD 30 MIN: CPT | Mod: S$PBB,,, | Performed by: INTERNAL MEDICINE

## 2018-09-20 NOTE — PROGRESS NOTES
Subjective:    Patient ID:  Jackie Landa is a 78 y.o. female who presents for follow-up of Atrial Fibrillation (5 month f/u - review nuc, echo and labs ) and Coronary Artery Disease      HPI  Here for follow up of CAD-PCI (2006)/PPM. Patients states is doing well no chest pain, SOB or change in exertional tolerence. Patient is exercising regularly with out symptoms.        Review of Systems   Constitution: Negative for malaise/fatigue.   Eyes: Negative for blurred vision.   Cardiovascular: Negative for chest pain, claudication, cyanosis, dyspnea on exertion, irregular heartbeat, leg swelling, near-syncope, orthopnea, palpitations, paroxysmal nocturnal dyspnea and syncope.   Respiratory: Negative for cough and shortness of breath.    Hematologic/Lymphatic: Does not bruise/bleed easily.   Musculoskeletal: Negative for back pain, falls, joint pain, muscle cramps, muscle weakness and myalgias.   Gastrointestinal: Negative for abdominal pain, change in bowel habit, nausea and vomiting.   Genitourinary: Negative for urgency.   Neurological: Negative for dizziness, focal weakness and light-headedness.        Objective:            Physical Exam   Constitutional: She is oriented to person, place, and time. She appears well-developed and well-nourished.   HENT:   Head: Normocephalic.   Eyes: Conjunctivae are normal.   Neck: Normal range of motion. Neck supple. No JVD present.   Cardiovascular: Normal rate, regular rhythm, normal heart sounds and intact distal pulses.   Pulses:       Carotid pulses are 2+ on the right side, and 2+ on the left side.       Radial pulses are 2+ on the right side, and 2+ on the left side.        Dorsalis pedis pulses are 2+ on the right side, and 2+ on the left side.        Posterior tibial pulses are 2+ on the right side, and 2+ on the left side.   Pacer site clean and dry, well healed.     Pulmonary/Chest: Effort normal and breath sounds normal.   Abdominal: Soft. Bowel sounds are  normal.   Musculoskeletal: She exhibits no edema or tenderness.   Neurological: She is alert and oriented to person, place, and time. Gait normal.   Skin: Skin is warm, dry and intact. No cyanosis. Nails show no clubbing.   Psychiatric: She has a normal mood and affect. Her speech is normal and behavior is normal. Thought content normal.   Nursing note and vitals reviewed.      ..    Chemistry        Component Value Date/Time     05/18/2018 0829    K 4.2 05/18/2018 0829     05/18/2018 0829    CO2 31 (H) 05/18/2018 0829    BUN 17 05/18/2018 0829    CREATININE 1.0 05/18/2018 0829    GLU 87 05/18/2018 0829        Component Value Date/Time    CALCIUM 9.6 05/18/2018 0829    ALKPHOS 74 05/18/2018 0829    AST 28 05/18/2018 0829    ALT 24 05/18/2018 0829    BILITOT 1.4 (H) 05/18/2018 0829    ESTGFRAFRICA >60.0 05/18/2018 0829    EGFRNONAA 54.1 (A) 05/18/2018 0829            ..  Lab Results   Component Value Date    CHOL 164 05/18/2018    CHOL 164 08/27/2016    CHOL 148 12/01/2015     Lab Results   Component Value Date    HDL 62 05/18/2018    HDL 66 08/27/2016    HDL 59 12/01/2015     Lab Results   Component Value Date    LDLCALC 85.6 05/18/2018    LDLCALC 69.8 08/27/2016    LDLCALC 69.4 12/01/2015     Lab Results   Component Value Date    TRIG 82 05/18/2018    TRIG 141 08/27/2016    TRIG 98 12/01/2015     Lab Results   Component Value Date    CHOLHDL 37.8 05/18/2018    CHOLHDL 40.2 08/27/2016    CHOLHDL 39.9 12/01/2015     ..  Lab Results   Component Value Date    WBC 6.84 05/18/2018    HGB 12.6 05/18/2018    HCT 41.4 05/18/2018     (H) 05/18/2018     05/18/2018       Test(s) Reviewed  I have reviewed the following in detail:  [x] Stress test   [] Angiography   [x] Echocardiogram   [x] Labs   [x] Other:       Assessment:         ICD-10-CM ICD-9-CM   1. S/P coronary artery stent placement - LAD 2006; Patent 09/2012 Z95.5 V45.82   2. Dyslipidemia E78.5 272.4   3. Cardiac pacemaker in situ Z95.0  V45.01   4. PAF (paroxysmal atrial fibrillation) I48.0 427.31   5. Coronary artery disease involving native coronary artery of native heart without angina pectoris I25.10 414.01     Problem List Items Addressed This Visit     CAD (coronary artery disease), native coronary artery    Overview     9/2016  [facility]  Mild, moderate stenosis. Pre and post-stent of the LAD verified by FFR.  Normal diastolic function and normal systolic function.         Cardiac pacemaker in situ (Chronic)    Overview     10/13 [employer] D[employer]           Dyslipidemia    PAF (paroxysmal atrial fibrillation) (Chronic)    Overview     On sotalol         S/P coronary artery stent placement - LAD 2006; Patent 09/2012 - Primary    Overview     2012 angio Patent LAD stent with 50-60 post stent dsx FFR 0.88                Plan:           Return to clinic 1 year   Low level/low impact aerobic exercise 5x's/wk. Heart healthy diet and risk factor modification.    See labs and med orders.  cfd 1 year follow AI. Strict SBP control

## 2018-09-24 ENCOUNTER — CLINICAL SUPPORT (OUTPATIENT)
Dept: CARDIOLOGY | Facility: CLINIC | Age: 78
End: 2018-09-24
Attending: INTERNAL MEDICINE
Payer: MEDICARE

## 2018-09-24 ENCOUNTER — PATIENT MESSAGE (OUTPATIENT)
Dept: FAMILY MEDICINE | Facility: CLINIC | Age: 78
End: 2018-09-24

## 2018-09-24 DIAGNOSIS — R00.1 BRADYCARDIA: ICD-10-CM

## 2018-09-24 DIAGNOSIS — Z95.0 CARDIAC PACEMAKER IN SITU: ICD-10-CM

## 2018-09-24 PROCEDURE — 93296 REM INTERROG EVL PM/IDS: CPT | Mod: PBBFAC,PO | Performed by: INTERNAL MEDICINE

## 2018-09-24 PROCEDURE — 93294 REM INTERROG EVL PM/LDLS PM: CPT | Mod: ,,, | Performed by: INTERNAL MEDICINE

## 2018-09-26 ENCOUNTER — OFFICE VISIT (OUTPATIENT)
Dept: FAMILY MEDICINE | Facility: CLINIC | Age: 78
End: 2018-09-26
Payer: MEDICARE

## 2018-09-26 VITALS
SYSTOLIC BLOOD PRESSURE: 110 MMHG | HEART RATE: 59 BPM | OXYGEN SATURATION: 97 % | BODY MASS INDEX: 25.45 KG/M2 | WEIGHT: 149.06 LBS | DIASTOLIC BLOOD PRESSURE: 64 MMHG | HEIGHT: 64 IN | RESPIRATION RATE: 18 BRPM

## 2018-09-26 DIAGNOSIS — L30.9 DERMATITIS OF FACE: Primary | ICD-10-CM

## 2018-09-26 PROCEDURE — 99999 PR PBB SHADOW E&M-EST. PATIENT-LVL IV: CPT | Mod: PBBFAC,,, | Performed by: FAMILY MEDICINE

## 2018-09-26 PROCEDURE — 1101F PT FALLS ASSESS-DOCD LE1/YR: CPT | Mod: CPTII,,, | Performed by: FAMILY MEDICINE

## 2018-09-26 PROCEDURE — 99213 OFFICE O/P EST LOW 20 MIN: CPT | Mod: S$PBB,,, | Performed by: FAMILY MEDICINE

## 2018-09-26 PROCEDURE — 99214 OFFICE O/P EST MOD 30 MIN: CPT | Mod: PBBFAC,PO | Performed by: FAMILY MEDICINE

## 2018-09-26 NOTE — PROGRESS NOTES
Subjective:       Patient ID: Jackie Landa is a 78 y.o. female.    Chief Complaint: Facial Redness    Initially with sore on lip that was biopsied 4 weeks ago by Dr. Mejia.      She began to develop chapped lips and rash around the lips.  She went to [facility] and was treated with antibiotic.  It improved and then began to get worse.    She went back to the [facility] and was prescribed Bactoban and ketoconazole    I saw her and considered impetigo.  She was treated with keflex and mupiricin.  Rash significantly improved    Since that visit she has seen dermatologist, Dr. Moura.  He gave Aquaphor and is she still improving.    Only now with mild redness and some peeling skin.                Past Medical History:   Diagnosis Date    A-fib     ARDS (adult respiratory distress syndrome) 2000    Bladder cancer     s/p BCG, interferon; cancer free since 1999, had twice, 1994 first    Coronary artery disease     9/2016-Mild, moderate stenosis. Pre and post-stent of the LAD verified by FFR.    Depression     Difficult intubation 10/2017    Had trauma and bruising to her mouth and tongue last intubation at [facility]    GERD (gastroesophageal reflux disease)     Hiatal hernia     HLD (hyperlipidemia)     Hypothyroidism     Intermittent self-catheterization of bladder     MVP (mitral valve prolapse)     Neurogenic bladder        Past Surgical History:   Procedure Laterality Date    APPENDECTOMY  1965    bladder stimulator  2008    not working    BREAST BIOPSY Right 2001    benign    CARDIAC CATHETERIZATION  09/02/2016    CARDIAC PACEMAKER PLACEMENT  9/27/13    CARDIAC PACEMAKER PLACEMENT  09/27/2013    CATARACT EXTRACTION W/ INTRAOCULAR LENS  IMPLANT, BILATERAL  2002    COLONOSCOPY      CORONARY ANGIOPLASTY WITH STENT PLACEMENT  2006    LAD    CYSTOSCOPY N/A 4/26/2018    [facility] OR    CYSTOSCOPY; Botox Injection N/A 10/10/2017    [facility] by Reinier Vallejo [facility] at [facility] OR     ESOPHAGOGASTRODUODENOSCOPY      EYE SURGERY Bilateral     FRACTURE SURGERY  2009    right ankle with hardware    HEART CATH-LEFT Left 2016    [facility] CATH    HYSTERECTOMY  1965    INJECTION-BOTOX N/A 2018    [facility] OR    INSERTION, CARDIAC PACEMAKER, DUAL CHAMBER N/A 2013    [facility]    OVARIAN CYST REMOVAL      left and right       Review of patient's allergies indicates:   Allergen Reactions    Codeine Itching    Phenergan [promethazine] Itching    Bactrim [sulfamethoxazole-trimethoprim] Hives    Darcalma [agurwi-aytum-k.blue-sal-sodium] Other (See Comments)     tachycardia    Levaquin [levofloxacin] Rash and Other (See Comments)     headache    Pyridium [phenazopyridine] Rash       Social History     Socioeconomic History    Marital status:      Spouse name: Not on file    Number of children: 4    Years of education: Not on file    Highest education level: Not on file   Social Needs    Financial resource strain: Not on file    Food insecurity - worry: Not on file    Food insecurity - inability: Not on file    Transportation needs - medical: Not on file    Transportation needs - non-medical: Not on file   Occupational History    Occupation:    Tobacco Use    Smoking status: Former Smoker     Years: 30.00     Types: Cigarettes     Last attempt to quit: 1987     Years since quittin.1    Smokeless tobacco: Never Used   Substance and Sexual Activity    Alcohol use: Yes     Alcohol/week: 0.6 oz     Types: 1 Glasses of wine per week     Comment: one daily    Drug use: No    Sexual activity: Not on file   Other Topics Concern    Not on file   Social History Narrative    living with her sister        Enjoys RV           Current Outpatient Medications on File Prior to Visit   Medication Sig Dispense Refill    acetaminophen (TYLENOL) 325 MG  tablet Take 1 tablet (325 mg total) by mouth every 6 (six) hours as needed for Pain.  0    albuterol (VENTOLIN HFA) 90 mcg/actuation inhaler INHALE 2 PUFFS INTO THE LUNGS EVERY 4 HOURS AS NEEDED FOR WHEEZING OR SHORTNESS OF BREATH (Patient taking differently: Inhale 1 puff into the lungs once daily. INHALE 2 PUFFS INTO THE LUNGS EVERY 4 HOURS AS NEEDED FOR WHEEZING OR SHORTNESS OF BREATH) 1 Inhaler 11    aspirin (ECOTRIN) 81 MG EC tablet Take 81 mg by mouth once daily.      BREO ELLIPTA 100-25 mcg/dose diskus inhaler Inhale 1 puff into the lungs once daily.       buPROPion (WELLBUTRIN XL) 150 MG TB24 tablet Take 150 mg by mouth once daily.      levothyroxine (SYNTHROID) 88 MCG tablet TAKE 1 TABLET EVERY DAY 90 tablet 3    LIDOCAINE VISCOUS 2 % solution TAKE 15 ML SWISHED INN THE MOUTH AND SPIT OUT NO MORE FREQUENTLY THAN EVERY 3 HOURS  0    multivitamin (ONE DAILY MULTIVITAMIN) per tablet Take 1 tablet by mouth once daily.      mupirocin (BACTROBAN) 2 % ointment APPLY TWICE A DAY FOR 7-10 DAYS  1    nitroGLYCERIN (NITROSTAT) 0.4 MG SL tablet Place 1 tablet (0.4 mg total) under the tongue every 5 (five) minutes as needed for Chest pain. 25 tablet 11    omeprazole (PRILOSEC) 20 MG capsule Take 20 mg by mouth daily as needed.      oxybutynin (DITROPAN XL) 15 MG TR24 TAKE 1 TABLET DAILY 90 tablet 1    simvastatin (ZOCOR) 40 MG tablet Take 1 tablet (40 mg total) by mouth every evening. 90 tablet 3    sotalol (BETAPACE) 80 MG tablet 40 mg BID (Patient taking differently: Take 40 mg by mouth 2 (two) times daily. 40 mg BID) 180 tablet 4    traMADol (ULTRAM) 50 mg tablet TAKE 1 TABLET BY MOUTH EVERY 6 HOURS AS NEEDED FOR PAIN, MAY CAUSE SEDATION 30 tablet 0     No current facility-administered medications on file prior to visit.        Family History   Problem Relation Age of Onset    Cancer Father         adrenal    Cancer Paternal Aunt         breast    Breast cancer Paternal Aunt 65    Heart disease  "Mother     Breast cancer Paternal Grandmother 65       Review of Systems   Constitutional: Negative for appetite change, chills, fever and unexpected weight change.   HENT: Negative for sore throat and trouble swallowing.    Eyes: Negative for pain and visual disturbance.   Respiratory: Negative for cough, shortness of breath and wheezing.    Cardiovascular: Negative for chest pain and palpitations.   Gastrointestinal: Negative for abdominal pain, blood in stool, diarrhea, nausea and vomiting.   Genitourinary: Negative for difficulty urinating, dysuria and hematuria.   Musculoskeletal: Negative for arthralgias, gait problem and neck pain.   Skin: Positive for rash. Negative for wound.   Neurological: Negative for dizziness, weakness, numbness and headaches.   Hematological: Negative for adenopathy.   Psychiatric/Behavioral: Negative for dysphoric mood.       Objective:      /64   Pulse (!) 59   Resp 18   Ht 5' 4" (1.626 m)   Wt 67.6 kg (149 lb 0.5 oz)   LMP  (LMP Unknown)   SpO2 97%   BMI 25.58 kg/m²   Physical Exam   Constitutional: She is oriented to person, place, and time. She appears well-developed and well-nourished.   HENT:   Head: Normocephalic.   Mouth/Throat: Oropharynx is clear and moist. No oropharyngeal exudate or posterior oropharyngeal erythema.   Eyes: Conjunctivae and EOM are normal. Pupils are equal, round, and reactive to light.   Neck: Normal range of motion. Neck supple. No thyromegaly present.   Cardiovascular: Normal rate, regular rhythm, S1 normal and S2 normal.   Pulmonary/Chest: Effort normal.   Abdominal: Normal appearance.   Musculoskeletal:        Right lower leg: She exhibits no edema.        Left lower leg: She exhibits no edema.   Lymphadenopathy:     She has no cervical adenopathy.   Neurological: She is alert and oriented to person, place, and time. No cranial nerve deficit. Gait normal.   Skin: Skin is warm and intact. Rash noted.   Psychiatric: She has a normal mood " and affect.     mild perioral redness and peeling.        Assessment:       1. Dermatitis of face        Plan:       Dermatitis of face        Rash is clearly resolving  Continue topical Aquaphor TID  F/u PRN

## 2018-10-01 LAB
AV DELAY - LONGEST: 260 MSEC
AV DELAY - SHORTEST: 150 MSEC
IMPEDANCE RA LEAD (NATIVE): 784 OHMS
IMPEDANCE RA LEAD: 573 OHMS
OHS CV DC PP MS1: 0.4 MS
OHS CV DC PP MS2: 0.4 MS
OHS CV DC PP V1: 2 V
OHS CV DC PP V2: 2 V
P/R-WAVE RA LEAD (NATIVE): 11.8 MV
P/R-WAVE RA LEAD: 3.2 MV
PV DELAY - LONGEST: 260 MSEC
PV DELAY - SHORTEST: 150 MSEC

## 2018-12-04 ENCOUNTER — OFFICE VISIT (OUTPATIENT)
Dept: DERMATOLOGY | Facility: CLINIC | Age: 78
End: 2018-12-04
Payer: MEDICARE

## 2018-12-04 DIAGNOSIS — Z85.828 PERSONAL HISTORY OF MALIGNANT NEOPLASM OF SKIN: ICD-10-CM

## 2018-12-04 DIAGNOSIS — L57.0 ACTINIC KERATOSIS: Primary | ICD-10-CM

## 2018-12-04 DIAGNOSIS — D18.00 ANGIOMA: ICD-10-CM

## 2018-12-04 DIAGNOSIS — L82.1 SEBORRHEIC KERATOSES: ICD-10-CM

## 2018-12-04 DIAGNOSIS — D22.9 MULTIPLE BENIGN NEVI: ICD-10-CM

## 2018-12-04 DIAGNOSIS — L72.0 MILIUM: ICD-10-CM

## 2018-12-04 PROCEDURE — 99999 PR PBB SHADOW E&M-EST. PATIENT-LVL II: CPT | Mod: PBBFAC,,, | Performed by: DERMATOLOGY

## 2018-12-04 PROCEDURE — 1101F PT FALLS ASSESS-DOCD LE1/YR: CPT | Mod: CPTII,S$GLB,, | Performed by: DERMATOLOGY

## 2018-12-04 PROCEDURE — 99203 OFFICE O/P NEW LOW 30 MIN: CPT | Mod: S$GLB,,, | Performed by: DERMATOLOGY

## 2018-12-04 RX ORDER — FLUOROURACIL 50 MG/G
CREAM TOPICAL 2 TIMES DAILY
Qty: 40 G | Refills: 1 | Status: SHIPPED | OUTPATIENT
Start: 2018-12-04 | End: 2020-07-15 | Stop reason: CLARIF

## 2018-12-04 NOTE — PROGRESS NOTES
Subjective:       Patient ID:  Jackie Landa is a 78 y.o. female who presents for   Chief Complaint   Patient presents with    Lesion     77 yo F presents for skin check with concerns of recurrent lesion to left ear.  She was told it was a precancer in the past and it was treated with liquid nitrogen.  It seems to have returned after treatment.  She noticed a tingling sensation to right nare for a duration of 4 months, no tx.  Upon further questioning, this is near the location of her pervious Mohs surgery          Dr Mejia Bx x 2 of lip and ear-->pre-cancers treated with LN (March 2018)      Past Medical History:   Diagnosis Date    A-fib     ARDS (adult respiratory distress syndrome) 2000    Bladder cancer     s/p BCG, interferon; cancer free since 1999, had twice, 1994 first    Coronary artery disease     9/2016-Mild, moderate stenosis. Pre and post-stent of the LAD verified by FFR.    Depression     Difficult intubation 10/2017    Had trauma and bruising to her mouth and tongue last intubation at [facility]    GERD (gastroesophageal reflux disease)     Hiatal hernia     HLD (hyperlipidemia)     Hypothyroidism     Intermittent self-catheterization of bladder     MVP (mitral valve prolapse)     Neurogenic bladder        Review of Systems   Skin: Positive for wears hat. Negative for itching, rash, dry skin and tendency to form keloidal scars.   Hematologic/Lymphatic: Bruises/bleeds easily.        Objective:    Physical Exam   Constitutional: She appears well-developed and well-nourished.   Neurological: She is alert and oriented to person, place, and time.   Psychiatric: She has a normal mood and affect.   Skin:   Areas Examined (abnormalities noted in diagram):   Head / Face Inspection Performed  Neck Inspection Performed  Chest / Axilla Inspection Performed  Abdomen Inspection Performed  Back Inspection Performed  RUE Inspected  LUE Inspection Performed  RLE Inspected  LLE Inspection  Performed                           Diagram Legend     Erythematous scaling macule/papule c/w actinic keratosis       Vascular papule c/w angioma      Pigmented verrucoid papule/plaque c/w seborrheic keratosis      Yellow umbilicated papule c/w sebaceous hyperplasia      Irregularly shaped tan macule c/w lentigo     1-2 mm smooth white papules consistent with Milia      Movable subcutaneous cyst with punctum c/w epidermal inclusion cyst      Subcutaneous movable cyst c/w pilar cyst      Firm pink to brown papule c/w dermatofibroma      Pedunculated fleshy papule(s) c/w skin tag(s)      Evenly pigmented macule c/w junctional nevus     Mildly variegated pigmented, slightly irregular-bordered macule c/w mildly atypical nevus      Flesh colored to evenly pigmented papule c/w intradermal nevus       Pink pearly papule/plaque c/w basal cell carcinoma      Erythematous hyperkeratotic cursted plaque c/w SCC      Surgical scar with no sign of skin cancer recurrence      Open and closed comedones      Inflammatory papules and pustules      Verrucoid papule consistent consistent with wart     Erythematous eczematous patches and plaques     Dystrophic onycholytic nail with subungual debris c/w onychomycosis     Umbilicated papule    Erythematous-base heme-crusted tan verrucoid plaque consistent with inflamed seborrheic keratosis     Erythematous Silvery Scaling Plaque c/w Psoriasis     See annotation      Assessment / Plan:        Actinic keratosis  Discussed treatment options including LN, field treatment with topical chemotherapy vs PDT  Discussed the likely erythema, crusting, swelling, inflammation   -     fluorouracil (EFUDEX) 5 % cream; Apply topically 2 (two) times daily.  Dispense: 40 g; Refill: 1    Angioma  This is a benign vascular lesion. Reassurance given. No treatment required.     Multiple benign nevi  Reassurance that her nevi appear benign with regular and consistent pigment pattern on dermoscopy    Seborrheic  keratoses  These are benign inherited growths without a malignant potential. Reassurance given to patient. No treatment is necessary.     Personal history of malignant neoplasm of skin  Area of previous NMSC evaluated with no signs of recurrence.  Skin examination performed today including at least 12 points as noted in physical examination. No lesions suspicious for malignancy noted.    Milium  Reassurance           Follow-up in about 6 weeks (around 1/15/2019) for Efudex follow up.

## 2018-12-26 ENCOUNTER — TELEPHONE (OUTPATIENT)
Dept: FAMILY MEDICINE | Facility: CLINIC | Age: 78
End: 2018-12-26

## 2018-12-26 NOTE — TELEPHONE ENCOUNTER
----- Message from Laurie Chaves sent at 12/26/2018  9:29 AM CST -----  Contact: Patient  Type: Needs Medical Advice    Who Called:  Patient  Symptoms (please be specific):  Face broken out  How long has patient had these symptoms:  3-4 days  Pharmacy name and phone #:    [id number] - [place], [place] - [address]  [address]  [place] [address]  Phone: [phone] Fax: [phone]  Best Call Back Number: [phone]  Additional Information: Calling to request something be called in. She said Dr Adan treated her before for the same thing. Please advise.

## 2018-12-26 NOTE — TELEPHONE ENCOUNTER
I would prefer she come in for me to see the rash since it was a complicated problem last time, treated with multiple medications.  Ok to offer 2:20 today or 1pm tomorrow with me.

## 2018-12-26 NOTE — TELEPHONE ENCOUNTER
Spoke to patient. Patient says she has a rash around her mouth again and is wanting to know if Dr. Adan can send her in something to help. She does not remember what actually cleared up the rash last time. Please advise.

## 2018-12-27 ENCOUNTER — OFFICE VISIT (OUTPATIENT)
Dept: FAMILY MEDICINE | Facility: CLINIC | Age: 78
End: 2018-12-27
Payer: MEDICARE

## 2018-12-27 VITALS
WEIGHT: 154.31 LBS | HEIGHT: 64 IN | BODY MASS INDEX: 26.34 KG/M2 | HEART RATE: 56 BPM | DIASTOLIC BLOOD PRESSURE: 80 MMHG | SYSTOLIC BLOOD PRESSURE: 124 MMHG | TEMPERATURE: 98 F

## 2018-12-27 DIAGNOSIS — L01.00 IMPETIGO: Primary | ICD-10-CM

## 2018-12-27 DIAGNOSIS — M54.2 NECK PAIN: ICD-10-CM

## 2018-12-27 DIAGNOSIS — M50.30 DDD (DEGENERATIVE DISC DISEASE), CERVICAL: ICD-10-CM

## 2018-12-27 DIAGNOSIS — E78.5 DYSLIPIDEMIA: ICD-10-CM

## 2018-12-27 DIAGNOSIS — M47.22 CERVICAL RADICULOPATHY DUE TO DEGENERATIVE JOINT DISEASE OF SPINE: ICD-10-CM

## 2018-12-27 PROCEDURE — 99999 PR PBB SHADOW E&M-EST. PATIENT-LVL V: CPT | Mod: PBBFAC,,, | Performed by: FAMILY MEDICINE

## 2018-12-27 PROCEDURE — 1101F PT FALLS ASSESS-DOCD LE1/YR: CPT | Mod: CPTII,S$GLB,, | Performed by: FAMILY MEDICINE

## 2018-12-27 PROCEDURE — 99214 OFFICE O/P EST MOD 30 MIN: CPT | Mod: S$GLB,,, | Performed by: FAMILY MEDICINE

## 2018-12-27 RX ORDER — CEPHALEXIN 500 MG/1
500 CAPSULE ORAL EVERY 8 HOURS
Qty: 30 CAPSULE | Refills: 0 | Status: SHIPPED | OUTPATIENT
Start: 2018-12-27 | End: 2019-01-06

## 2018-12-27 RX ORDER — HYDROCODONE BITARTRATE AND ACETAMINOPHEN 5; 325 MG/1; MG/1
1 TABLET ORAL
Status: ON HOLD | COMMUNITY
End: 2019-09-05 | Stop reason: SDUPTHER

## 2018-12-27 NOTE — PROGRESS NOTES
Subjective:       Patient ID: Jackie Landa is a 78 y.o. female.    Chief Complaint: Neck Pain    C/o recurrence of rash around lips 4 days ago with redness and crusting.  Had similar rash in September which eventually resolved with keflex and mupiricin.  She has been trying mupiricin.    C/o persistent right lateral neck pain with radiation to right anterior choulder and up the back of the neck.  She is following with Dr. Christian, who recently did rhizotomy, but this affected her pacemaker.              Past Medical History:   Diagnosis Date    A-fib     ARDS (adult respiratory distress syndrome) 2000    Bladder cancer     s/p BCG, interferon; cancer free since 1999, had twice, 1994 first    Coronary artery disease     9/2016-Mild, moderate stenosis. Pre and post-stent of the LAD verified by FFR.    DDD (degenerative disc disease), cervical     Depression     Difficult intubation 10/2017    Had trauma and bruising to her mouth and tongue last intubation at [facility]    GERD (gastroesophageal reflux disease)     Hiatal hernia     HLD (hyperlipidemia)     Hypothyroidism     Intermittent self-catheterization of bladder     MVP (mitral valve prolapse)     Neurogenic bladder        Past Surgical History:   Procedure Laterality Date    APPENDECTOMY  1965    bladder stimulator  2008    not working    BREAST BIOPSY Right 2001    benign    CARDIAC CATHETERIZATION  09/02/2016    CARDIAC PACEMAKER PLACEMENT  9/27/13    CARDIAC PACEMAKER PLACEMENT  09/27/2013    CATARACT EXTRACTION W/ INTRAOCULAR LENS  IMPLANT, BILATERAL  2002    COLONOSCOPY      CORONARY ANGIOPLASTY WITH STENT PLACEMENT  2006    LAD    CYSTOSCOPY N/A 4/26/2018    [facility]    CYSTOSCOPY; Botox Injection N/A 10/10/2017    [facility]ier [facility]jo [facility] at [facility] OR    ESOPHAGOGASTRODUODENOSCOPY      EYE SURGERY Bilateral     FRACTURE SURGERY  2009    right ankle with hardware    HEART CATH-LEFT  Left 2016    [facility] CATH    HYSTERECTOMY  1965    INJECTION-BOTOX N/A 2018    [facility] OR    INSERTION, CARDIAC PACEMAKER, DUAL CHAMBER N/A 2013    [facility] CATH LAB    OVARIAN CYST REMOVAL  1975    left and right       Review of patient's allergies indicates:   Allergen Reactions    Codeine Itching    Phenergan [promethazine] Itching    Bactrim [sulfamethoxazole-trimethoprim] Hives    Darcalma [wihwra-ipucc-b.blue-sal-sodium] Other (See Comments)     tachycardia    Levaquin [levofloxacin] Rash and Other (See Comments)     headache    Pyridium [phenazopyridine] Rash       Social History     Socioeconomic History    Marital status:      Spouse name: Not on file    Number of children: 4    Years of education: Not on file    Highest education level: Not on file   Social Needs    Financial resource strain: Not on file    Food insecurity - worry: Not on file    Food insecurity - inability: Not on file    Transportation needs - medical: Not on file    Transportation needs - non-medical: Not on file   Occupational History    Occupation:    Tobacco Use    Smoking status: Former Smoker     Years: 30.00     Types: Cigarettes     Last attempt to quit: 1987     Years since quittin.3    Smokeless tobacco: Never Used   Substance and Sexual Activity    Alcohol use: Yes     Alcohol/week: 0.6 oz     Types: 1 Glasses of wine per week     Comment: one daily    Drug use: No    Sexual activity: Not on file   Other Topics Concern    Not on file   Social History Narrative    living with her sister        Enjoys RV           Current Outpatient Medications on File Prior to Visit   Medication Sig Dispense Refill    acetaminophen (TYLENOL) 325 MG tablet Take 1 tablet (325 mg total) by mouth every 6 (six) hours as needed for Pain.  0    albuterol (VENTOLIN HFA) 90 mcg/actuation  inhaler INHALE 2 PUFFS INTO THE LUNGS EVERY 4 HOURS AS NEEDED FOR WHEEZING OR SHORTNESS OF BREATH (Patient taking differently: Inhale 1 puff into the lungs once daily. INHALE 2 PUFFS INTO THE LUNGS EVERY 4 HOURS AS NEEDED FOR WHEEZING OR SHORTNESS OF BREATH) 1 Inhaler 11    aspirin (ECOTRIN) 81 MG EC tablet Take 81 mg by mouth once daily.      BREO ELLIPTA 100-25 mcg/dose diskus inhaler Inhale 1 puff into the lungs once daily.       fluorouracil (EFUDEX) 5 % cream Apply topically 2 (two) times daily. 40 g 1    HYDROcodone-acetaminophen (NORCO) 5-325 mg per tablet Take 1 tablet by mouth.      levothyroxine (SYNTHROID) 88 MCG tablet TAKE 1 TABLET EVERY DAY 90 tablet 3    multivitamin (ONE DAILY MULTIVITAMIN) per tablet Take 1 tablet by mouth once daily.      nitroGLYCERIN (NITROSTAT) 0.4 MG SL tablet Place 1 tablet (0.4 mg total) under the tongue every 5 (five) minutes as needed for Chest pain. 25 tablet 11    omeprazole (PRILOSEC) 20 MG capsule Take 20 mg by mouth daily as needed.      oxybutynin (DITROPAN XL) 15 MG TR24 TAKE 1 TABLET DAILY 90 tablet 1    simvastatin (ZOCOR) 40 MG tablet Take 1 tablet (40 mg total) by mouth every evening. 90 tablet 3    sotalol (BETAPACE) 80 MG tablet 40 mg BID (Patient taking differently: Take 40 mg by mouth 2 (two) times daily. 40 mg BID) 180 tablet 4    traMADol (ULTRAM) 50 mg tablet TAKE 1 TABLET BY MOUTH EVERY 6 HOURS AS NEEDED FOR PAIN, MAY CAUSE SEDATION 30 tablet 0    LIDOCAINE VISCOUS 2 % solution TAKE 15 ML SWISHED INN THE MOUTH AND SPIT OUT NO MORE FREQUENTLY THAN EVERY 3 HOURS  0    mupirocin (BACTROBAN) 2 % ointment APPLY TWICE A DAY FOR 7-10 DAYS  1     No current facility-administered medications on file prior to visit.        Family History   Problem Relation Age of Onset    Cancer Father         adrenal    Cancer Paternal Aunt         breast    Breast cancer Paternal Aunt 65    Heart disease Mother     Breast cancer Paternal Grandmother 65  "      Review of Systems   Constitutional: Negative for appetite change, chills, fever and unexpected weight change.   HENT: Negative for sore throat and trouble swallowing.    Eyes: Negative for pain and visual disturbance.   Respiratory: Negative for cough, shortness of breath and wheezing.    Cardiovascular: Negative for chest pain and palpitations.   Gastrointestinal: Negative for abdominal pain, blood in stool, diarrhea, nausea and vomiting.   Genitourinary: Negative for difficulty urinating, dysuria and hematuria.   Musculoskeletal: Positive for neck pain. Negative for arthralgias and gait problem.   Skin: Positive for rash. Negative for wound.   Neurological: Negative for dizziness, weakness, numbness and headaches.   Hematological: Negative for adenopathy.   Psychiatric/Behavioral: Negative for dysphoric mood.       Objective:      /80 (BP Location: Left arm, Patient Position: Sitting, BP Method: Large (Manual))   Pulse (!) 56   Temp 98.2 °F (36.8 °C) (Oral)   Ht 5' 4" (1.626 m)   Wt 70 kg (154 lb 5.2 oz)   LMP  (LMP Unknown)   BMI 26.49 kg/m²   Physical Exam   Constitutional: She is oriented to person, place, and time. She appears well-developed and well-nourished.   HENT:   Head: Normocephalic.   Mouth/Throat: Oropharynx is clear and moist. No oropharyngeal exudate or posterior oropharyngeal erythema.   Eyes: Conjunctivae and EOM are normal. Pupils are equal, round, and reactive to light.   Neck: Normal range of motion. Neck supple. No thyromegaly present.   Cardiovascular: Normal rate, regular rhythm, S1 normal and S2 normal.   Pulmonary/Chest: Effort normal.   Abdominal: Normal appearance.   Musculoskeletal:        Cervical back: She exhibits pain. She exhibits normal range of motion and no bony tenderness.        Right lower leg: She exhibits no edema.        Left lower leg: She exhibits no edema.   Lymphadenopathy:     She has no cervical adenopathy.   Neurological: She is alert and oriented " to person, place, and time. She has normal strength. No cranial nerve deficit. Gait normal.   Skin: Skin is warm and intact. Rash noted.   Psychiatric: She has a normal mood and affect.      perioral redness and crusting (see picture)                Assessment:       1. Impetigo    2. Neck pain    3. DDD (degenerative disc disease), cervical    4. Cervical radiculopathy due to degenerative joint disease of spine    5. Dyslipidemia        Plan:       Impetigo  -     cephALEXin (KEFLEX) 500 MG capsule; Take 1 capsule (500 mg total) by mouth every 8 (eight) hours. for 10 days  Dispense: 30 capsule; Refill: 0    Neck pain  -     CT Cervical Spine Without Contrast; Future; Expected date: 12/27/2018  -     Ambulatory Referral to Physical/Occupational Therapy    DDD (degenerative disc disease), cervical  -     Ambulatory Referral to Physical/Occupational Therapy    Cervical radiculopathy due to degenerative joint disease of spine  -     Ambulatory Referral to Physical/Occupational Therapy    Dyslipidemia  -     Comprehensive metabolic panel; Future; Expected date: 12/27/2018  -     Lipid panel; Future; Expected date: 12/27/2018        Rash is recurrent and appears to be impetigo  Continue topical mupiricin. Begin oral course of Keflex.  Will get CT neck and refer to PT for assistance with cervical radiculopathy  Basic neck stretching  Continue simvastatin and other present meds  Labs today

## 2018-12-27 NOTE — Clinical Note
Please review this patient's mouth imaging from today. She has has a recurrenceof perioral rash that looks likes imeptigo. I am treating her with Keflex and Mupiricin topically which she will begin today.  Any other treatment suggestions, or does this sound appropriate to you?

## 2018-12-28 ENCOUNTER — HOSPITAL ENCOUNTER (OUTPATIENT)
Dept: RADIOLOGY | Facility: HOSPITAL | Age: 78
Discharge: HOME | End: 2018-12-28
Attending: FAMILY MEDICINE
Payer: MEDICARE

## 2018-12-28 DIAGNOSIS — M54.2 NECK PAIN: ICD-10-CM

## 2018-12-28 PROCEDURE — 72125 CT NECK SPINE W/O DYE: CPT | Mod: TC,PO

## 2018-12-28 PROCEDURE — 72125 CT NECK SPINE W/O DYE: CPT | Mod: 26,,, | Performed by: RADIOLOGY

## 2019-01-03 ENCOUNTER — CLINICAL SUPPORT (OUTPATIENT)
Dept: CARDIOLOGY | Facility: CLINIC | Age: 79
End: 2019-01-03
Attending: INTERNAL MEDICINE
Payer: MEDICARE

## 2019-01-03 DIAGNOSIS — R00.1 BRADYCARDIA: ICD-10-CM

## 2019-01-03 DIAGNOSIS — Z95.0 CARDIAC PACEMAKER IN SITU: ICD-10-CM

## 2019-01-03 PROCEDURE — 93280 PM DEVICE PROGR EVAL DUAL: CPT | Mod: S$GLB,,, | Performed by: INTERNAL MEDICINE

## 2019-01-03 PROCEDURE — 93280 CARDIAC DEVICE CHECK - IN CLINIC: ICD-10-PCS | Mod: S$GLB,,, | Performed by: INTERNAL MEDICINE

## 2019-01-07 ENCOUNTER — PATIENT MESSAGE (OUTPATIENT)
Dept: FAMILY MEDICINE | Facility: CLINIC | Age: 79
End: 2019-01-07

## 2019-01-07 DIAGNOSIS — Z12.39 SCREENING BREAST EXAMINATION: Primary | ICD-10-CM

## 2019-01-07 DIAGNOSIS — Z12.31 ENCOUNTER FOR SCREENING MAMMOGRAM FOR BREAST CANCER: ICD-10-CM

## 2019-01-08 ENCOUNTER — OFFICE VISIT (OUTPATIENT)
Dept: DERMATOLOGY | Facility: CLINIC | Age: 79
End: 2019-01-08
Payer: MEDICARE

## 2019-01-08 ENCOUNTER — PATIENT MESSAGE (OUTPATIENT)
Dept: DERMATOLOGY | Facility: CLINIC | Age: 79
End: 2019-01-08

## 2019-01-08 DIAGNOSIS — L25.9 CONTACT DERMATITIS, UNSPECIFIED CONTACT DERMATITIS TYPE, UNSPECIFIED TRIGGER: Primary | ICD-10-CM

## 2019-01-08 DIAGNOSIS — H61.002 CHONDRODERMATITIS NODULARIS HELICIS OF LEFT EAR: ICD-10-CM

## 2019-01-08 LAB
AV DELAY - LONGEST: 260 MSEC
AV DELAY - SHORTEST: 150 MSEC
IMPEDANCE RA LEAD (NATIVE): 821 OHMS
IMPEDANCE RA LEAD: 582 OHMS
P/R-WAVE RA LEAD (NATIVE): 15.2 MV
P/R-WAVE RA LEAD: 3.3 MV
PV DELAY - LONGEST: 260 MSEC
PV DELAY - SHORTEST: 150 MSEC
THRESHOLD MS RA LEAD (NATIVE): 0.4 MS
THRESHOLD MS RA LEAD: 0.4 MS
THRESHOLD V RA LEAD (NATIVE): 0.7 V
THRESHOLD V RA LEAD: 0.4 V

## 2019-01-08 PROCEDURE — 99213 OFFICE O/P EST LOW 20 MIN: CPT | Mod: S$GLB,,, | Performed by: DERMATOLOGY

## 2019-01-08 PROCEDURE — 99999 PR PBB SHADOW E&M-EST. PATIENT-LVL II: ICD-10-PCS | Mod: PBBFAC,,, | Performed by: DERMATOLOGY

## 2019-01-08 PROCEDURE — 99999 PR PBB SHADOW E&M-EST. PATIENT-LVL II: CPT | Mod: PBBFAC,,, | Performed by: DERMATOLOGY

## 2019-01-08 PROCEDURE — 1101F PR PT FALLS ASSESS DOC 0-1 FALLS W/OUT INJ PAST YR: ICD-10-PCS | Mod: CPTII,S$GLB,, | Performed by: DERMATOLOGY

## 2019-01-08 PROCEDURE — 1101F PT FALLS ASSESS-DOCD LE1/YR: CPT | Mod: CPTII,S$GLB,, | Performed by: DERMATOLOGY

## 2019-01-08 PROCEDURE — 99213 PR OFFICE/OUTPT VISIT, EST, LEVL III, 20-29 MIN: ICD-10-PCS | Mod: S$GLB,,, | Performed by: DERMATOLOGY

## 2019-01-08 RX ORDER — HYDROCORTISONE 25 MG/G
CREAM TOPICAL 2 TIMES DAILY
Qty: 28 G | Refills: 1 | Status: SHIPPED | OUTPATIENT
Start: 2019-01-08 | End: 2020-11-02 | Stop reason: SDUPTHER

## 2019-01-08 NOTE — PROGRESS NOTES
Subjective:       Patient ID:  Jackie Landa is a 78 y.o. female who presents for   Chief Complaint   Patient presents with    Spot     Nose    Lesion     Mouth     77 y/o F presents for follow up.  She experienced a rash around her mouth x 2 weeks.  This was her 2nd episode and it has improved drastically with mupirocin and Keflex.  Pt denies any symptoms currently.      Upon further questioning, prior to this rash, she added baking soda to her oral hygiene routine due to dental problems    She has been treating her nose and L ear with Efudex and did not notice any obvious reaction after 4 weeks  She sleeps on L side every night due to injury on R side.     Past Medical History:  A-fib  2000: ARDS (adult respiratory distress syndrome)  Bladder cancer      Comment:  s/p BCG, interferon; cancer free since 1999, had twice,                1994 first  Coronary artery disease      Comment:  9/2016-Mild, moderate stenosis. Pre and post-stent of                the LAD verified by FFR.  DDD (degenerative disc disease), cervical  Depression  10/2017: Difficult intubation      Comment:  Had trauma and bruising to her mouth and tongue last                intubation at [facility]  GERD (gastroesophageal reflux disease)  Hiatal hernia  HLD (hyperlipidemia)  Hypothyroidism  Intermittent self-catheterization of bladder  MVP (mitral valve prolapse)  Neurogenic bladder        Review of Systems   Constitutional: Negative for fever and chills.   Skin: Positive for itching, dry skin and wears hat. Negative for rash, daily sunscreen use and activity-related sunscreen use.        Objective:    Physical Exam   Constitutional: She appears well-developed and well-nourished.   HENT:   Head:       Neurological: She is alert and oriented to person, place, and time.   Psychiatric: She has a normal mood and affect.   Skin:   Areas Examined (abnormalities noted in diagram):   Scalp / Hair Palpated and Inspected  Head / Face Inspection  Performed  Neck Inspection Performed  Chest / Axilla Inspection Performed           Photo from OV 12/27/18 with Dr Adan         Diagram Legend     Erythematous scaling macule/papule c/w actinic keratosis       Vascular papule c/w angioma      Pigmented verrucoid papule/plaque c/w seborrheic keratosis      Yellow umbilicated papule c/w sebaceous hyperplasia      Irregularly shaped tan macule c/w lentigo     1-2 mm smooth white papules consistent with Milia      Movable subcutaneous cyst with punctum c/w epidermal inclusion cyst      Subcutaneous movable cyst c/w pilar cyst      Firm pink to brown papule c/w dermatofibroma      Pedunculated fleshy papule(s) c/w skin tag(s)      Evenly pigmented macule c/w junctional nevus     Mildly variegated pigmented, slightly irregular-bordered macule c/w mildly atypical nevus      Flesh colored to evenly pigmented papule c/w intradermal nevus       Pink pearly papule/plaque c/w basal cell carcinoma      Erythematous hyperkeratotic cursted plaque c/w SCC      Surgical scar with no sign of skin cancer recurrence      Open and closed comedones      Inflammatory papules and pustules      Verrucoid papule consistent consistent with wart     Erythematous eczematous patches and plaques     Dystrophic onycholytic nail with subungual debris c/w onychomycosis     Umbilicated papule    Erythematous-base heme-crusted tan verrucoid plaque consistent with inflamed seborrheic keratosis     Erythematous Silvery Scaling Plaque c/w Psoriasis     See annotation      Assessment / Plan:        Contact dermatitis, unspecified contact dermatitis type  I suspect this intermittently flaring perioral rash is either lip licker's dermatitis or contact dermatitis possibly due to baking soda   -     hydrocortisone 2.5 % cream; Apply topically 2 (two) times daily.  Dispense: 28 g; Refill: 1    Chondrodermatitis nodularis helicis of left ear  Due to its lack of reaction to Efudex, it seems to be more  consistent with [facility]  I recommend [facility] pillow to try to relieve pressure on this ear           Follow-up for Pt will send msg via [facility].

## 2019-01-14 ENCOUNTER — PATIENT MESSAGE (OUTPATIENT)
Dept: FAMILY MEDICINE | Facility: CLINIC | Age: 79
End: 2019-01-14

## 2019-01-14 ENCOUNTER — TELEPHONE (OUTPATIENT)
Dept: FAMILY MEDICINE | Facility: CLINIC | Age: 79
End: 2019-01-14

## 2019-01-14 RX ORDER — TRAMADOL HYDROCHLORIDE 50 MG/1
50 TABLET ORAL EVERY 6 HOURS PRN
Qty: 30 TABLET | Refills: 0 | Status: SHIPPED | OUTPATIENT
Start: 2019-01-14 | End: 2020-02-03 | Stop reason: SDUPTHER

## 2019-01-14 RX ORDER — OXYBUTYNIN CHLORIDE 15 MG/1
TABLET, EXTENDED RELEASE ORAL
Qty: 90 TABLET | Refills: 1 | Status: SHIPPED | OUTPATIENT
Start: 2019-01-14 | End: 2019-07-13 | Stop reason: SDUPTHER

## 2019-01-14 NOTE — TELEPHONE ENCOUNTER
Last visit with authorizing provider: Hany Adan MD:  12/27/2018     Next visit with authorizing provider: Hany Adan MD:  Visit date not found    Last e-scripted to [facility]  On 06/25/2018   for 6 month supply

## 2019-01-14 NOTE — TELEPHONE ENCOUNTER
----- Message from Ariane Moran sent at 1/14/2019  8:22 AM CST -----  Type: Needs Medical Advice    Who Called:  Sheridan kyle/[place]  Best Call Back Number: [phone]  Additional Information: [facility] is requesting an order, CMN and clinical notes to approve physical therapy fax to [phone].    Thank you

## 2019-01-20 DIAGNOSIS — E03.4 HYPOTHYROIDISM DUE TO ACQUIRED ATROPHY OF THYROID: ICD-10-CM

## 2019-01-21 RX ORDER — LEVOTHYROXINE SODIUM 88 UG/1
TABLET ORAL
Qty: 90 TABLET | Refills: 0 | Status: SHIPPED | OUTPATIENT
Start: 2019-01-21 | End: 2019-04-19 | Stop reason: SDUPTHER

## 2019-01-21 NOTE — PROGRESS NOTES
Refill Authorization Note     is requesting a refill authorization.    Brief assessment and rationale for refill: APPROVE: Needs Labs   Amount/Quantity of medication ordered: 90d        Refills Authorized: Yes  If authorized number of refills: 0        Medication-related problems identified: Requires labs  Medication Therapy Plan: HYPOTHYROID- lco( 0227/2018); NTBO(TSH); Approve 3 more months   Name and strength of medication: levothyroxine (SYNTHROID) 88 MCG tablet  How patient will take medication: t1t po qd  Medication reconciliation completed: No        Comments:   Last Thyroid (12 months or within 3 months of dosage adjustment)  Lab Results   Component Value Date    TSH 2.110 08/27/2016    TSH 0.584 12/01/2015    TSH 0.491 11/06/2014    No results found for: FREET4     Appointment in past 12 months or upcoming 90 days  Last visit with authorizing provider:  Hany Adan MD:12/27/2018     Next visit with authorizing provider:   Hany Aadn MD:Visit date not found

## 2019-01-23 ENCOUNTER — HOSPITAL ENCOUNTER (OUTPATIENT)
Dept: RADIOLOGY | Facility: HOSPITAL | Age: 79
Discharge: HOME | End: 2019-01-23
Attending: FAMILY MEDICINE
Payer: MEDICARE

## 2019-01-23 DIAGNOSIS — Z12.31 ENCOUNTER FOR SCREENING MAMMOGRAM FOR BREAST CANCER: ICD-10-CM

## 2019-01-23 PROCEDURE — 77063 MAMMO DIGITAL SCREENING BILAT WITH TOMOSYNTHESIS_CAD: ICD-10-PCS | Mod: 26,,, | Performed by: RADIOLOGY

## 2019-01-23 PROCEDURE — 77067 SCR MAMMO BI INCL CAD: CPT | Mod: 26,,, | Performed by: RADIOLOGY

## 2019-01-23 PROCEDURE — 77067 SCR MAMMO BI INCL CAD: CPT | Mod: TC,PO

## 2019-01-23 PROCEDURE — 77067 MAMMO DIGITAL SCREENING BILAT WITH TOMOSYNTHESIS_CAD: ICD-10-PCS | Mod: 26,,, | Performed by: RADIOLOGY

## 2019-01-23 PROCEDURE — 77063 BREAST TOMOSYNTHESIS BI: CPT | Mod: 26,,, | Performed by: RADIOLOGY

## 2019-01-24 NOTE — TELEPHONE ENCOUNTER
Pt called and scheduled labs as needed. No furteher needs verbalized understanding of appointment information.-ERNIE

## 2019-01-28 PROBLEM — N39.41 URGE URINARY INCONTINENCE: Status: ACTIVE | Noted: 2019-01-28

## 2019-01-31 ENCOUNTER — CLINICAL SUPPORT (OUTPATIENT)
Dept: REHABILITATION | Facility: HOSPITAL | Age: 79
End: 2019-01-31
Payer: MEDICARE

## 2019-01-31 DIAGNOSIS — M54.2 NECK PAIN ON RIGHT SIDE: ICD-10-CM

## 2019-01-31 DIAGNOSIS — M43.6 NECK STIFFNESS: ICD-10-CM

## 2019-01-31 DIAGNOSIS — R29.898 SHOULDER WEAKNESS: ICD-10-CM

## 2019-01-31 PROCEDURE — G8979 MOBILITY GOAL STATUS: HCPCS | Mod: CJ,PO

## 2019-01-31 PROCEDURE — G8978 MOBILITY CURRENT STATUS: HCPCS | Mod: CK,PO

## 2019-01-31 PROCEDURE — 97110 THERAPEUTIC EXERCISES: CPT | Mod: PO

## 2019-01-31 PROCEDURE — 97162 PT EVAL MOD COMPLEX 30 MIN: CPT | Mod: PO

## 2019-01-31 NOTE — PLAN OF CARE
[facility]  Physical Therapy Initial Evaluation    Name: Jackie Valerio [facility] Number: [id number]    Therapy Diagnosis:   Encounter Diagnoses   Name Primary?    Shoulder weakness     Neck pain on right side     Neck stiffness      Physician: Hany Adan MD    Physician Orders: PT Eval and Treat   Medical Diagnosis from Referral: Neck pain, DDD (degenerative disc disease), cervical, Cervical radiculopathy due to degenerative joint disease of spine   Evaluation Date: 1/31/2019  Authorization Period Expiration: 03/17/2019   Plan of Care Expiration: 03/28/2019  Visit # / Visits authorized: 1/ 12    Time In: 900  Time Out: 1000  Total Billable Time: 60 minutes    Precautions: Standard    Subjective   Date of onset: About 10 years ago  History of current condition - Jackie reports: Her neck pain began about 10 years ago, but has progressed. She reports her neck pain consistently bothers her with any activity now. She reports her neck pain increases if she sits and looks down while sewing or at the computer. She reports she uses ice packs and heat packs to help with the pain. Jackie states she goes to the [employer] physical gym three times per week doing and avoids doing exercise that exacerbate her pain. She reports that when the pain is bad will get a headache, usually once or twice a week.       Medical History:   Past Medical History:   Diagnosis Date    A-fib     ARDS (adult respiratory distress syndrome) 2000    Bladder cancer     s/p BCG, interferon; cancer free since 1999, had twice, 1994 first    Coronary artery disease     9/2016-Mild, moderate stenosis. Pre and post-stent of the LAD verified by FFR.    DDD (degenerative disc disease), cervical     Depression     Difficult intubation 10/2017    Had trauma and bruising to her mouth and tongue last intubation at [facility]    GERD (gastroesophageal reflux disease)     Hiatal hernia     HLD (hyperlipidemia)      Hypothyroidism     Intermittent self-catheterization of bladder     MVP (mitral valve prolapse)     Neurogenic bladder        Surgical History:   Jackie Landa  has a past surgical history that includes Appendectomy (1965); Ovarian cyst removal (1975); Hysterectomy (1965); Coronary angioplasty with stent (2006); bladder stimulator (2008); Cataract extraction w/ intraocular lens  implant, bilateral (2002); Esophagogastroduodenoscopy; Colonoscopy; Fracture surgery (2009); Eye surgery (Bilateral); Breast biopsy (Right, 2001); Cardiac pacemaker placement (9/27/13); Cardiac pacemaker placement (09/27/2013); Cardiac catheterization (09/02/2016); radiofrequency neurotomy (06/11/2018); cystoscopy (N/A, 1/28/2019); and injection of botulinum toxin type a (N/A, 1/28/2019).    Medications:   Jackie has a current medication list which includes the following prescription(s): acetaminophen, albuterol, aspirin, breo ellipta, doxycycline, fluorouracil, hydrocodone-acetaminophen, hydrocortisone, levothyroxine, multivitamin, nitroglycerin, omeprazole, oxybutynin, simvastatin, sotalol, tramadol, vitamin d, and vitamin e.    Allergies:   Review of patient's allergies indicates:   Allergen Reactions    Codeine Itching    Phenergan [promethazine] Itching    Bactrim [sulfamethoxazole-trimethoprim] Hives    Darcalma [fnonzl-kmujm-a.blue-sal-sodium] Other (See Comments)     tachycardia    Levaquin [levofloxacin] Rash and Other (See Comments)     headache    Pyridium [phenazopyridine] Rash        Imaging, CT scan films:   1. Multilevel degenerative change of the CS resulting in multilevel central canal and neuroforaminal stenosis with particularly pronounced right-sided neuroforaminal stenosis noted at C5-C6 and C6-C7.  Please correlate clinically for symptoms referable to the right C6 and C7 nerves.  2. Calcification of the transverse ligament adjacent to the odontoid process and possible erosions involving the right C4-C5  facet joint, findings which could be explained by CPPD.  Erosions of the right C4-C5 facet joint could also be explained by inflammatory arthritis or gout.     Prior Therapy: Yes, [facility] two/three years ago for her neck pain   Social History: lives alone  Occupation: Retired, likes to sew, work in yard, ride bike, work out at the gym  Prior Level of Function: Independent   Current Level of Function: Independent     Pain:  Current 2/10, worst 10/10, best 0/10   Location: right neck  and shoulder   Description: Aching and (constant)  Aggravating Factors: Looking down, gardening   Easing Factors: pain medication, ice, heating pad and rest    Pts goals: To get some relief and see if we can correct wrong    Objective     Observation: Sits with forward head posture    Posture: Forward head, increased thoracic kyphosis     Cervical Range of Motion:    % LImitation Pain   Flexion 10% Pain      Extension 50%  Pain     Right Rotation 50% Pain      Left Rotation 50% Increased pain      Right Side Bending 50% pain   Left Side Bending 50% Pain      Shoulder Range of Motion:   Shoulder Left Right   Flexion WFL WFL   Abduction WFL WFL   ER WFL WFL   IR WFL WFL     Strength:    Upper Extremity Strength  (R) UE  (L) UE    Shoulder flexion: 4/5 Shoulder flexion: 4-/5   Shoulder Abduction: 4/5 Shoulder abduction: 4/5   Shoulder ER 4/5 Shoulder ER 4/5   Shoulder IR 4/5 Shoulder IR 4/5   Elbow flexion: 4/5 Elbow flexion: 4/5   Elbow extension: 4/5 Elbow extension: 4/5   Wrist flexion: 4+/5 Wrist flexion: 4+/5   Wrist extension: 4+/5 Wrist extension: 4+/5    4/5 : 5/5         Special Tests:  Distraction +    Compression +   Spurlings +   Sharp-Oren Negative   VA test Negative     Lateral Flexion Alar Ligament Negative   DNF test Limited endurance     Negative (R) Empty Can     Reflexes:    Reflex Left Right   Bicep 2+ 2+   Tricep 2+ 2+   More Negative Negative       Joint Mobility: Moderate hypomobility cervical spine  C4-C6 PA's and transverse glides    Palpation: Mod TTP at (R) UT, (R) LS, (R) posterior shoulder      Sensation: Intact to LT     Flexibility: mild pec tightness    CMS Impairment/Limitation/Restriction for FOTO Cervical Spine Survey    Therapist reviewed FOTO scores for Jackie Landa on 1/31/2019.   FOTO documents entered into [email or web address] - see Media section.    Limitation Score: 43%  Category: Mobility    Current : CK = at least 40% but < 60% impaired, limited or restricted  Goal: CJ = at least 20% but < 40% impaired, limited or restricted         TREATMENT   Treatment Time In: 940  Treatment Time Out: 950  Total Treatment time separate from Evaluation: 10 minutes    Jackie received therapeutic exercises to develop strength, ROM and posture for 10 minutes including:  Chin tucks x 10 (3 second hold)  Corner stretch 3 x 30 seconds  Scap retractions 2 x 10 GTB     Home Exercises and Patient Education Provided    Education provided:   - Role of PT, PT POC, HEP    Written Home Exercises Provided: yes.  Exercises were reviewed and Jackie was able to demonstrate them prior to the end of the session.  Jackie demonstrated good  understanding of the education provided.     See EMR under Media for exercises provided 1/31/2019.    Assessment   Jackie is a 78 y.o. female referred to outpatient Physical Therapy with a medical diagnosis of Neck pain, DDD (degenerative disc disease), cervical, Cervical radiculopathy due to degenerative joint disease of spine . Physical exam is consistent with cervical radiculopathy into the R shoulder. Primary impairments include AROM, PROM, joint mobility, strength, soft tissue restrictions, poor posture, and pain which limits functional mobility. This pt is a good candidate for skilled PT tx and stands to benefit from a combination of manual therapy including cervical distraction, joint mobilizations with trigger point/myofacscial release, therapeutic exercise to establish core/joint  stability, neuromuscular/postural re-education, and modalities Prn. The pt has been educated on their dx/POC and consents to further PT tx.    Pt prognosis is Good.   Pt will benefit from skilled outpatient Physical Therapy to address the deficits stated above and in the chart below, provide pt/family education, and to maximize pt's level of independence.     Plan of care discussed with patient: Yes  Pt's spiritual, cultural and educational needs considered and patient is agreeable to the plan of care and goals as stated below:     Anticipated Barriers for therapy: None    Medical Necessity is demonstrated by the following  History  Co-morbidities and personal factors that may impact the plan of care Co-morbidities:   advanced age, CAD, depression and bradycardia , dyslipidemia, overactive bladder    Personal Factors:   age     moderate   Examination  Body Structures and Functions, activity limitations and participation restrictions that may impact the plan of care Body Regions:   neck  upper extremities    Body Systems:    ROM  strength    Participation Restrictions:   Has to take frequent breaks while performing her hobbies (gardening, sewing)    Activity limitations:   Learning and applying knowledge  no deficits    General Tasks and Commands  no deficits    Communication  hard of hearing    Mobility  lifting and carrying objects    Self care  no deficits    Domestic Life  doing house work (cleaning house, washing dishes, laundry)    Interactions/Relationships  no deficits    Life Areas  no deficits    Community and Social Life  recreation and leisure         moderate   Clinical Presentation evolving clinical presentation with changing clinical characteristics moderate   Decision Making/ Complexity Score: moderate     Goals:  Short Term Goals (4 Weeks):   1. Pt to increase strength by 1/2 muscle grade of muscles tested to allow for improvement in functional activities such as performing sewing/gardening/going to  gym.  2. Pt to improve gross cervical motion in rotation by 25% to allow for improved functional mobility.  3. Patient to demonstrate good seated posture for >5 minutes with no verbal cueing.   4. Pt to report compliance with HEP 3x/week and demonstrate proper exercise technique to PT to show competence with self management of condition.     Long Term Goals (8 Weeks):   1. Pt to achieve <40% limitation as measured by the FOTO to demonstrate decreased disability.  2. Pt to increase strength to 5/5 of muscles test to allow for improvement in functional activities such as performing sewing/gardening/going to gym..  3. Pt to improve gross spinal motion to < 10% limitations to allow for improved functional mobility with performing ADL's and hobbies such as sewing and gardening.   4. Pt to report compliance with HEP 3x/week and demonstrate proper exercise technique to PT to show independence with self management of condition.    Plan   Plan of care Certification: 1/31/2019 to 03/28/2019.    Outpatient Physical Therapy 2 times weekly for 8 weeks to include the following interventions: Cervical Traction, Manual Therapy, Moist Heat/ Ice, Neuromuscular Re-ed, Patient Education and Therapeutic Exercise.     Yusuf Gonzalez, PT

## 2019-02-04 NOTE — PROGRESS NOTES
Physical Therapy Daily Treatment Note     Name: Jackie Valerio [facility] Number: [id number]    Therapy Diagnosis:   Encounter Diagnoses   Name Primary?    Shoulder weakness     Neck pain on right side     Neck stiffness      Physician: Hany Adan MD    Visit Date: 2/5/2019  Physician Orders: PT Eval and Treat   Medical Diagnosis from Referral: Neck pain, DDD (degenerative disc disease), cervical, Cervical radiculopathy due to degenerative joint disease of spine   Evaluation Date: 1/31/2019  Authorization Period Expiration: 03/17/2019   Plan of Care Expiration: 03/28/2019  Visit # / Visits authorized: 2/ 12    Time In: 1055  Time Out: 1135  Total Billable Time: 40 minutes    Precautions: Standard    Subjective     Pt reports: She had muscle soreness following her initial evaluation. She reports an improvement with her symptoms following her initial evaluation, but stated she had a headache yesterday when she woke up.   She was compliant with home exercise program.  Response to previous treatment: Muscle soreness  Functional change: Too soon to tell    Pain: 2/10  Location: right shoulder      Objective     Jackie received therapeutic exercises to develop strength, ROM and posture for 25 minutes including:  Chin tucks x 10 (3 second hold)  Corner stretch 3 x 30 seconds - NP  Scap retractions 2 x 10 GTB   Shoulder extensions 2 x 10 GTB  Pulleys flexion x 3 min  Supine T's and X's 2 x 10 RTB  (B) Shoulder IR 2 x 10 RTB  (B) Shoulder ER 2 x 10 RTB   Scaption 1# 2 x 10     Jackie received the following manual therapy techniques: Manual traction and Soft tissue Mobilization were applied to the: cervical spine for 15 minutes, including:  STM to R UT  Suboccipital release   Cervical distraction     Home Exercises Provided and Patient Education Provided     Education provided:   - Continuing with HEP     Written Home Exercises Provided: yes.  Exercises were reviewed and Jackie was able to demonstrate them  prior to the end of the session.  Jackie demonstrated good  understanding of the education provided.     See EMR under Media for exercises provided 2/5/2019.    Assessment     Jackie tolerated treatment well. Needs verbal cueing for form corrections with exercises. Improvement with pain and ROM following manual therapy and exercises today.    Jackie is progressing well towards her goals.   Pt prognosis is Good.     Pt will continue to benefit from skilled outpatient physical therapy to address the deficits listed in the problem list box on initial evaluation, provide pt/family education and to maximize pt's level of independence in the home and community environment.     Pt's spiritual, cultural and educational needs considered and pt agreeable to plan of care and goals.    Anticipated barriers to physical therapy: None    Goals: Short Term Goals (4 Weeks):   1. Pt to increase strength by 1/2 muscle grade of muscles tested to allow for improvement in functional activities such as performing sewing/gardening/going to gym.  2. Pt to improve gross cervical motion in rotation by 25% to allow for improved functional mobility.  3. Patient to demonstrate good seated posture for >5 minutes with no verbal cueing.   4. Pt to report compliance with HEP 3x/week and demonstrate proper exercise technique to PT to show competence with self management of condition.      Long Term Goals (8 Weeks):   1. Pt to achieve <40% limitation as measured by the FOTO to demonstrate decreased disability.  2. Pt to increase strength to 5/5 of muscles test to allow for improvement in functional activities such as performing sewing/gardening/going to gym..  3. Pt to improve gross spinal motion to < 10% limitations to allow for improved functional mobility with performing ADL's and hobbies such as sewing and gardening.   4. Pt to report compliance with HEP 3x/week and demonstrate proper exercise technique to PT to show independence with self  management of condition.    Plan     Continue POC     Yusuf Gonzalez, PT

## 2019-02-05 ENCOUNTER — CLINICAL SUPPORT (OUTPATIENT)
Dept: REHABILITATION | Facility: HOSPITAL | Age: 79
End: 2019-02-05
Payer: MEDICARE

## 2019-02-05 DIAGNOSIS — M43.6 NECK STIFFNESS: ICD-10-CM

## 2019-02-05 DIAGNOSIS — R29.898 SHOULDER WEAKNESS: ICD-10-CM

## 2019-02-05 DIAGNOSIS — M54.2 NECK PAIN ON RIGHT SIDE: ICD-10-CM

## 2019-02-05 PROCEDURE — 97140 MANUAL THERAPY 1/> REGIONS: CPT | Mod: PO

## 2019-02-05 PROCEDURE — 97110 THERAPEUTIC EXERCISES: CPT | Mod: PO

## 2019-02-05 NOTE — PROGRESS NOTES
"  Physical Therapy Daily Treatment Note     Name: Jackie Valerio [facility] Number: [id number]    Therapy Diagnosis:   Encounter Diagnoses   Name Primary?    Shoulder weakness     Neck pain on right side     Neck stiffness      Physician: Hany Adan MD    Visit Date: 2/7/2019  Physician Orders: PT Eval and Treat   Medical Diagnosis from Referral: Neck pain, DDD (degenerative disc disease), cervical, Cervical radiculopathy due to degenerative joint disease of spine   Evaluation Date: 1/31/2019  Authorization Period Expiration: 03/17/2019   Plan of Care Expiration: 03/28/2019  Visit # / Visits authorized: 3/12    Time In: 652  Time Out: 740  Total Billable Time: 48 minutes    Precautions: Standard    Subjective     Pt reports: "I am feeling pretty good. My pain is a 1/10 maybe." She reports she did not have to use any biofreeze this morning because her neck is feeling better.   She was compliant with home exercise program.  Response to previous treatment: Muscle soreness  Functional change: Decreased pain    Pain: 1/10 "maybe"  Location: right shoulder      Objective     Jackie received therapeutic exercises to develop strength, ROM and posture for 33 minutes including:  Chin tucks 2 x 10 (3 second hold)  Corner stretch 2 x 30 seconds  Scap retractions 2 x 10 GTB   Shoulder extensions 2 x 10 GTB  Pulleys flexion/ abduction x 3'/3'  Supine T's and X's 2 x 10 RTB  (B) Shoulder IR 2 x 10 RTB  (B) Shoulder ER 2 x 10 RTB   Scaption 1# 2 x 10   B Shoulder external rotation with scapular retrcaction 2 x 10  RTB    Jackie received the following manual therapy techniques: Manual traction and Soft tissue Mobilization were applied to the: cervical spine for 15 minutes, including:  STM to R UT  Suboccipital release   Cervical distraction     Home Exercises Provided and Patient Education Provided     Education provided:   - Continuing with HEP     Written Home Exercises Provided: yes.  Exercises were reviewed and " Jackie was able to demonstrate them prior to the end of the session.  Jackie demonstrated good  understanding of the education provided.     See EMR under Media for exercises provided 2/5/2019.    Assessment     Jackie tolerated treatment well. She still has increased pain with cervical extension and rotation, but the intensity is reduced as compared to evaluation. Will continue to focus on improving posture and strengthening scapular muscles as patient continues to improve with treatment.     Jackie is progressing well towards her goals.   Pt prognosis is Good.     Pt will continue to benefit from skilled outpatient physical therapy to address the deficits listed in the problem list box on initial evaluation, provide pt/family education and to maximize pt's level of independence in the home and community environment.     Pt's spiritual, cultural and educational needs considered and pt agreeable to plan of care and goals.    Anticipated barriers to physical therapy: None    Goals: Short Term Goals (4 Weeks):   1. Pt to increase strength by 1/2 muscle grade of muscles tested to allow for improvement in functional activities such as performing sewing/gardening/going to gym.  2. Pt to improve gross cervical motion in rotation by 25% to allow for improved functional mobility.  3. Patient to demonstrate good seated posture for >5 minutes with no verbal cueing.   4. Pt to report compliance with HEP 3x/week and demonstrate proper exercise technique to PT to show competence with self management of condition.      Long Term Goals (8 Weeks):   1. Pt to achieve <40% limitation as measured by the FOTO to demonstrate decreased disability.  2. Pt to increase strength to 5/5 of muscles test to allow for improvement in functional activities such as performing sewing/gardening/going to gym..  3. Pt to improve gross spinal motion to < 10% limitations to allow for improved functional mobility with performing ADL's and hobbies such  as sewing and gardening.   4. Pt to report compliance with HEP 3x/week and demonstrate proper exercise technique to PT to show independence with self management of condition.    Plan     Continue POC     Yusuf Gonzalez, PT

## 2019-02-07 ENCOUNTER — CLINICAL SUPPORT (OUTPATIENT)
Dept: REHABILITATION | Facility: HOSPITAL | Age: 79
End: 2019-02-07
Payer: MEDICARE

## 2019-02-07 DIAGNOSIS — R29.898 SHOULDER WEAKNESS: ICD-10-CM

## 2019-02-07 DIAGNOSIS — M43.6 NECK STIFFNESS: ICD-10-CM

## 2019-02-07 DIAGNOSIS — M54.2 NECK PAIN ON RIGHT SIDE: ICD-10-CM

## 2019-02-07 PROCEDURE — 97110 THERAPEUTIC EXERCISES: CPT | Mod: PO

## 2019-02-07 PROCEDURE — 97140 MANUAL THERAPY 1/> REGIONS: CPT | Mod: PO

## 2019-02-11 NOTE — PROGRESS NOTES
Physical Therapy Daily Treatment Note     Name: Jackie Valerio [facility] Number: [id number]    Therapy Diagnosis:   Encounter Diagnoses   Name Primary?    Shoulder weakness     Neck pain on right side     Neck stiffness      Physician: Hany Adan MD    Visit Date: 2/12/2019  Physician Orders: PT Eval and Treat   Medical Diagnosis from Referral: Neck pain, DDD (degenerative disc disease), cervical, Cervical radiculopathy due to degenerative joint disease of spine   Evaluation Date: 1/31/2019  Authorization Period Expiration: 03/17/2019   Plan of Care Expiration: 03/28/2019  Visit # / Visits authorized: 4/12    Time In: 658  Time Out: 750  Total Billable Time: 52 minutes    Precautions: Standard    Subjective     Pt reports: she had pain when she woke up Friday morning and that she has not had much pain besides Friday morning.   She was compliant with home exercise program.  Response to previous treatment: Muscle soreness  Functional change: Decreased pain    Pain: 0/10   Location: L UT       Objective     Jackie received therapeutic exercises to develop strength, ROM and posture for 32 minutes including:  Chin tucks 2 x 10 (3 second hold)  Corner stretch 3 x 30 seconds  Scap retractions 3 x 10 GTB   Shoulder extensions 3 x 10 GTB  Pulleys flexion/ abduction x 3'/3'  Supine T's and X's 3 x 10 RTB  (B) Shoulder IR 2 x 10 RTB  (B) Shoulder ER 2 x 10 RTB   Scaption 2# 2 x 10   B Shoulder external rotation with scapular retrcaction 2 x 10  RTB  Chin Tucks with rotation in supine within pain free range    Jackie received the following manual therapy techniques: Manual traction and Soft tissue Mobilization were applied to the: cervical spine for 20 minutes, including:  STM to (B) UT, LS, cervical paraspinals  Suboccipital release   Cervical distraction     Home Exercises Provided and Patient Education Provided     Education provided:   - Continuing with HEP     Written Home Exercises Provided:  yes.  Exercises were reviewed and Jackie was able to demonstrate them prior to the end of the session.  Jackie demonstrated good  understanding of the education provided.     See EMR under Media for exercises provided 2/5/2019.    Assessment     Jackie tolerated treatment well. She is now having increased pain in her L UT with cervical rotation when compared to the R. Increased tone noted in L UT, which improved after manual therapy. She does not have any pain with passive cervical rotation. Will continue to focus on postural exercises due to her increased forward head posture.     Jackie is progressing well towards her goals.   Pt prognosis is Good.     Pt will continue to benefit from skilled outpatient physical therapy to address the deficits listed in the problem list box on initial evaluation, provide pt/family education and to maximize pt's level of independence in the home and community environment.     Pt's spiritual, cultural and educational needs considered and pt agreeable to plan of care and goals.    Anticipated barriers to physical therapy: None    Goals: Short Term Goals (4 Weeks):   1. Pt to increase strength by 1/2 muscle grade of muscles tested to allow for improvement in functional activities such as performing sewing/gardening/going to gym.  2. Pt to improve gross cervical motion in rotation by 25% to allow for improved functional mobility.  3. Patient to demonstrate good seated posture for >5 minutes with no verbal cueing.   4. Pt to report compliance with HEP 3x/week and demonstrate proper exercise technique to PT to show competence with self management of condition.      Long Term Goals (8 Weeks):   1. Pt to achieve <40% limitation as measured by the FOTO to demonstrate decreased disability.  2. Pt to increase strength to 5/5 of muscles test to allow for improvement in functional activities such as performing sewing/gardening/going to gym..  3. Pt to improve gross spinal motion to < 10%  limitations to allow for improved functional mobility with performing ADL's and hobbies such as sewing and gardening.   4. Pt to report compliance with HEP 3x/week and demonstrate proper exercise technique to PT to show independence with self management of condition.    Plan     Continue POC     Yusuf Gonzalez, PT

## 2019-02-12 ENCOUNTER — CLINICAL SUPPORT (OUTPATIENT)
Dept: REHABILITATION | Facility: HOSPITAL | Age: 79
End: 2019-02-12
Payer: MEDICARE

## 2019-02-12 DIAGNOSIS — M43.6 NECK STIFFNESS: ICD-10-CM

## 2019-02-12 DIAGNOSIS — M54.2 NECK PAIN ON RIGHT SIDE: ICD-10-CM

## 2019-02-12 DIAGNOSIS — R29.898 SHOULDER WEAKNESS: ICD-10-CM

## 2019-02-12 PROCEDURE — 97110 THERAPEUTIC EXERCISES: CPT | Mod: PO

## 2019-02-12 PROCEDURE — 97140 MANUAL THERAPY 1/> REGIONS: CPT | Mod: PO

## 2019-02-12 NOTE — PROGRESS NOTES
"  Physical Therapy Daily Treatment Note     Name: Jackie Valerio [facility] Number: [id number]    Therapy Diagnosis:   Encounter Diagnoses   Name Primary?    Shoulder weakness     Neck pain on right side     Neck stiffness      Physician: Hany Adan MD    Visit Date: 2/14/2019  Physician Orders: PT Eval and Treat   Medical Diagnosis from Referral: Neck pain, DDD (degenerative disc disease), cervical, Cervical radiculopathy due to degenerative joint disease of spine   Evaluation Date: 1/31/2019  Authorization Period Expiration: 03/17/2019   Plan of Care Expiration: 03/28/2019  Visit # / Visits authorized: 5/12    Time In: 657  Time Out: 750  Total Billable Time: 30 minutes    Precautions: Standard    Subjective     Pt reports: "I felt really good after Tuesday. I took some guitar lessons this week."  She was compliant with home exercise program.  Response to previous treatment: Muscle soreness  Functional change: Decreased pain    Pain: 1/10   Location: B UT       Objective     Jackie received therapeutic exercises to develop strength, ROM and posture for 32 minutes including:  Chin tucks 2 x 10 (3 second hold)  Corner stretch 3 x 30 seconds  Scap retractions 3 x 10 GTB   Shoulder extensions 3 x 10 GTB  Pulleys flexion/ abduction x 3'/3'  Supine T's and X's 3 x 10 RTB  (B) Shoulder IR 2 x 10 RTB  (B) Shoulder ER 2 x 10 RTB   Scaption 2# 2 x 10   B Shoulder external rotation with scapular retrcaction 2 x 10  RTB  Chin Tucks with rotation in supine within pain free range    Jackie received the following manual therapy techniques: Manual traction and Soft tissue Mobilization were applied to the: cervical spine for 15 minutes, including:  STM to (B) UT, LS, cervical paraspinals  Suboccipital release   Cervical distraction     Home Exercises Provided and Patient Education Provided     Education provided:   - Continuing with HEP     Written Home Exercises Provided: yes.  Exercises were reviewed and Jackie " was able to demonstrate them prior to the end of the session.  Jackie demonstrated good  understanding of the education provided.     See EMR under Media for exercises provided 2/5/2019.    Assessment     Jackie tolerated treatment well. Cervical range of motion continues to improve and she continues to respond well to manual therapy. Will continue with postural exercises as patient continues to improve with signs and symptoms.      Jackie is progressing well towards her goals.   Pt prognosis is Good.     Pt will continue to benefit from skilled outpatient physical therapy to address the deficits listed in the problem list box on initial evaluation, provide pt/family education and to maximize pt's level of independence in the home and community environment.     Pt's spiritual, cultural and educational needs considered and pt agreeable to plan of care and goals.    Anticipated barriers to physical therapy: None    Goals: Short Term Goals (4 Weeks):   1. Pt to increase strength by 1/2 muscle grade of muscles tested to allow for improvement in functional activities such as performing sewing/gardening/going to gym.  2. Pt to improve gross cervical motion in rotation by 25% to allow for improved functional mobility.  3. Patient to demonstrate good seated posture for >5 minutes with no verbal cueing.   4. Pt to report compliance with HEP 3x/week and demonstrate proper exercise technique to PT to show competence with self management of condition.      Long Term Goals (8 Weeks):   1. Pt to achieve <40% limitation as measured by the FOTO to demonstrate decreased disability.  2. Pt to increase strength to 5/5 of muscles test to allow for improvement in functional activities such as performing sewing/gardening/going to gym..  3. Pt to improve gross spinal motion to < 10% limitations to allow for improved functional mobility with performing ADL's and hobbies such as sewing and gardening.   4. Pt to report compliance with HEP  3x/week and demonstrate proper exercise technique to PT to show independence with self management of condition.    Plan     Continue POC     Yusuf Gonzalez, PT

## 2019-02-14 ENCOUNTER — CLINICAL SUPPORT (OUTPATIENT)
Dept: REHABILITATION | Facility: HOSPITAL | Age: 79
End: 2019-02-14
Payer: MEDICARE

## 2019-02-14 DIAGNOSIS — M43.6 NECK STIFFNESS: ICD-10-CM

## 2019-02-14 DIAGNOSIS — R29.898 SHOULDER WEAKNESS: ICD-10-CM

## 2019-02-14 DIAGNOSIS — M54.2 NECK PAIN ON RIGHT SIDE: ICD-10-CM

## 2019-02-14 PROCEDURE — 97110 THERAPEUTIC EXERCISES: CPT | Mod: PO

## 2019-02-14 PROCEDURE — 97140 MANUAL THERAPY 1/> REGIONS: CPT | Mod: PO

## 2019-02-19 ENCOUNTER — CLINICAL SUPPORT (OUTPATIENT)
Dept: REHABILITATION | Facility: HOSPITAL | Age: 79
End: 2019-02-19
Payer: MEDICARE

## 2019-02-19 DIAGNOSIS — M43.6 NECK STIFFNESS: ICD-10-CM

## 2019-02-19 DIAGNOSIS — M54.2 NECK PAIN ON RIGHT SIDE: ICD-10-CM

## 2019-02-19 DIAGNOSIS — R29.898 SHOULDER WEAKNESS: ICD-10-CM

## 2019-02-19 PROCEDURE — 97140 MANUAL THERAPY 1/> REGIONS: CPT | Mod: PO

## 2019-02-19 PROCEDURE — 97110 THERAPEUTIC EXERCISES: CPT | Mod: PO

## 2019-02-19 NOTE — PROGRESS NOTES
"  Physical Therapy Daily Treatment Note     Name: Jackie Valerio [facility] Number: [id number]    Therapy Diagnosis:   Encounter Diagnoses   Name Primary?    Shoulder weakness     Neck pain on right side     Neck stiffness      Physician: Hany Adan MD    Visit Date: 2/21/2019  Physician Orders: PT Eval and Treat   Medical Diagnosis from Referral: Neck pain, DDD (degenerative disc disease), cervical, Cervical radiculopathy due to degenerative joint disease of spine   Evaluation Date: 1/31/2019  Authorization Period Expiration: 03/17/2019   Plan of Care Expiration: 03/28/2019  Visit # / Visits authorized: 7/12    Time In: 900  Time Out: 955  Total Billable Time: 55 minutes    Precautions: Standard    Subjective     Pt reports: "I had a headache yesterday morning when I woke up. My pain is in the center of my neck today.'  She was compliant with home exercise program.  Response to previous treatment: Muscle soreness  Functional change: Decreased pain    Pain: 1/10   Location: N/A     Objective     Jackie received therapeutic exercises to develop strength, ROM and posture for 32 minutes including:  Chin tucks 2 x 10 (3 second hold)  Corner stretch 3 x 30 seconds  Scap retractions 3 x 10 GTB   Shoulder extensions 3 x 10 GTB  Pulleys flexion/ abduction x 3'/3'  Supine T's and X's 2 x 10 GTB  (B) Shoulder IR 2 x 10 GTB  (B) Shoulder ER 2 x 10 GTB   Scaption 2# 2 x 10   B Shoulder external rotation with scapular retrcaction 2 x 10  GTB  Chin Tucks with rotation in supine within pain free range x 10     Jackie received the following manual therapy techniques: Manual traction and Soft tissue Mobilization were applied to the: cervical spine for 20 minutes, including:  STM to (B) UT, LS, cervical paraspinals  Suboccipital release   Cervical distraction     Home Exercises Provided and Patient Education Provided     Education provided:   - Continuing with HEP     Written Home Exercises Provided: yes.  Exercises " were reviewed and Jackie was able to demonstrate them prior to the end of the session.  Jackie demonstrated good  understanding of the education provided.     See EMR under Media for exercises provided 2/5/2019.    Assessment     Jackie tolerated treatment well. Mild pain with cervical flexion today in center of cervical spine, improved after manual therapy. No radicular symptoms present today. Will Reassess next visit.     Jackie is progressing well towards her goals.   Pt prognosis is Good.     Pt will continue to benefit from skilled outpatient physical therapy to address the deficits listed in the problem list box on initial evaluation, provide pt/family education and to maximize pt's level of independence in the home and community environment.     Pt's spiritual, cultural and educational needs considered and pt agreeable to plan of care and goals.    Anticipated barriers to physical therapy: None    Goals: Short Term Goals (4 Weeks):   1. Pt to increase strength by 1/2 muscle grade of muscles tested to allow for improvement in functional activities such as performing sewing/gardening/going to gym.  2. Pt to improve gross cervical motion in rotation by 25% to allow for improved functional mobility.  3. Patient to demonstrate good seated posture for >5 minutes with no verbal cueing.   4. Pt to report compliance with HEP 3x/week and demonstrate proper exercise technique to PT to show competence with self management of condition.      Long Term Goals (8 Weeks):   1. Pt to achieve <40% limitation as measured by the FOTO to demonstrate decreased disability.  2. Pt to increase strength to 5/5 of muscles test to allow for improvement in functional activities such as performing sewing/gardening/going to gym..  3. Pt to improve gross spinal motion to < 10% limitations to allow for improved functional mobility with performing ADL's and hobbies such as sewing and gardening.   4. Pt to report compliance with HEP 3x/week  and demonstrate proper exercise technique to PT to show independence with self management of condition.    Plan     Continue POC     Yusuf Gonzalez, PT

## 2019-02-21 ENCOUNTER — CLINICAL SUPPORT (OUTPATIENT)
Dept: REHABILITATION | Facility: HOSPITAL | Age: 79
End: 2019-02-21
Payer: MEDICARE

## 2019-02-21 DIAGNOSIS — M43.6 NECK STIFFNESS: ICD-10-CM

## 2019-02-21 DIAGNOSIS — R29.898 SHOULDER WEAKNESS: ICD-10-CM

## 2019-02-21 DIAGNOSIS — M54.2 NECK PAIN ON RIGHT SIDE: ICD-10-CM

## 2019-02-21 PROCEDURE — 97110 THERAPEUTIC EXERCISES: CPT | Mod: PO

## 2019-02-21 PROCEDURE — 97140 MANUAL THERAPY 1/> REGIONS: CPT | Mod: PO

## 2019-02-22 ENCOUNTER — OFFICE VISIT (OUTPATIENT)
Dept: FAMILY MEDICINE | Facility: CLINIC | Age: 79
End: 2019-02-22
Payer: MEDICARE

## 2019-02-22 VITALS
HEART RATE: 60 BPM | TEMPERATURE: 98 F | OXYGEN SATURATION: 96 % | WEIGHT: 153.69 LBS | SYSTOLIC BLOOD PRESSURE: 142 MMHG | HEIGHT: 64 IN | BODY MASS INDEX: 26.24 KG/M2 | DIASTOLIC BLOOD PRESSURE: 72 MMHG

## 2019-02-22 DIAGNOSIS — C67.9 MALIGNANT NEOPLASM OF URINARY BLADDER, UNSPECIFIED SITE: ICD-10-CM

## 2019-02-22 DIAGNOSIS — J40 BRONCHITIS: Primary | ICD-10-CM

## 2019-02-22 DIAGNOSIS — I48.0 PAF (PAROXYSMAL ATRIAL FIBRILLATION): ICD-10-CM

## 2019-02-22 DIAGNOSIS — E03.4 HYPOTHYROIDISM DUE TO ACQUIRED ATROPHY OF THYROID: ICD-10-CM

## 2019-02-22 DIAGNOSIS — J42 ACUTE EXACERBATION OF CHRONIC BRONCHITIS: ICD-10-CM

## 2019-02-22 DIAGNOSIS — J20.9 ACUTE EXACERBATION OF CHRONIC BRONCHITIS: ICD-10-CM

## 2019-02-22 DIAGNOSIS — I25.111 CORONARY ARTERY DISEASE INVOLVING NATIVE CORONARY ARTERY OF NATIVE HEART WITH ANGINA PECTORIS WITH DOCUMENTED SPASM: ICD-10-CM

## 2019-02-22 PROCEDURE — 99499 UNLISTED E&M SERVICE: CPT | Mod: S$GLB,,, | Performed by: NURSE PRACTITIONER

## 2019-02-22 PROCEDURE — 1101F PT FALLS ASSESS-DOCD LE1/YR: CPT | Mod: CPTII,S$GLB,, | Performed by: NURSE PRACTITIONER

## 2019-02-22 PROCEDURE — 99214 OFFICE O/P EST MOD 30 MIN: CPT | Mod: S$GLB,,, | Performed by: NURSE PRACTITIONER

## 2019-02-22 PROCEDURE — 99214 PR OFFICE/OUTPT VISIT, EST, LEVL IV, 30-39 MIN: ICD-10-PCS | Mod: S$GLB,,, | Performed by: NURSE PRACTITIONER

## 2019-02-22 PROCEDURE — 99499 RISK ADDL DX/OHS AUDIT: ICD-10-PCS | Mod: S$GLB,,, | Performed by: NURSE PRACTITIONER

## 2019-02-22 PROCEDURE — 1101F PR PT FALLS ASSESS DOC 0-1 FALLS W/OUT INJ PAST YR: ICD-10-PCS | Mod: CPTII,S$GLB,, | Performed by: NURSE PRACTITIONER

## 2019-02-22 PROCEDURE — 99999 PR PBB SHADOW E&M-EST. PATIENT-LVL IV: CPT | Mod: PBBFAC,,, | Performed by: NURSE PRACTITIONER

## 2019-02-22 PROCEDURE — 99999 PR PBB SHADOW E&M-EST. PATIENT-LVL IV: ICD-10-PCS | Mod: PBBFAC,,, | Performed by: NURSE PRACTITIONER

## 2019-02-22 RX ORDER — PREDNISONE 10 MG/1
TABLET ORAL
Qty: 30 TABLET | Refills: 0 | Status: SHIPPED | OUTPATIENT
Start: 2019-02-22 | End: 2019-03-08 | Stop reason: ALTCHOICE

## 2019-02-22 NOTE — PROGRESS NOTES
Subjective:       Patient ID: Jackie Landa is a 78 y.o. female.    Chief Complaint: Cough (x 3 days); Sore Throat; and Nasal Congestion    Cough   This is a new problem. The current episode started in the past 7 days (3 days ). The problem has been rapidly improving. The cough is productive of purulent sputum and productive of sputum. Associated symptoms include nasal congestion, postnasal drip, rhinorrhea and wheezing. Pertinent negatives include no chest pain, chills, ear congestion, ear pain, fever, headaches, heartburn, hemoptysis, myalgias, rash, sore throat, shortness of breath, sweats or weight loss. She has tried a beta-agonist inhaler, rest and steroid inhaler (therafluy) for the symptoms. The treatment provided mild relief. Her past medical history is significant for COPD.     Vitals:    02/22/19 0902   BP: (!) 142/72   Pulse: 60   Temp: 98.1 °F (36.7 °C)     Review of Systems   Constitutional: Positive for fatigue. Negative for chills, diaphoresis, fever and weight loss.   HENT: Positive for congestion, postnasal drip and rhinorrhea. Negative for ear pain and sore throat.    Eyes: Negative.    Respiratory: Positive for cough and wheezing. Negative for hemoptysis and shortness of breath.         Productive cough    Cardiovascular: Negative.  Negative for chest pain.   Gastrointestinal: Negative for abdominal pain, diarrhea, heartburn and nausea.   Endocrine: Negative.    Genitourinary: Negative.  Negative for dysuria and hematuria.   Musculoskeletal: Negative.  Negative for myalgias.   Skin: Negative for color change and rash.   Allergic/Immunologic: Negative.    Neurological: Negative.  Negative for speech difficulty, numbness and headaches.   Hematological: Negative.    Psychiatric/Behavioral: Negative.        Past Medical History:   Diagnosis Date    A-fib     ARDS (adult respiratory distress syndrome) 2000    Bladder cancer     s/p BCG, interferon; cancer free since 1999, had twice, 1994  first    Coronary artery disease     9/2016-Mild, moderate stenosis. Pre and post-stent of the LAD verified by FFR.    DDD (degenerative disc disease), cervical     Depression     Difficult intubation 10/2017    Had trauma and bruising to her mouth and tongue last intubation at [facility]    GERD (gastroesophageal reflux disease)     Hiatal hernia     HLD (hyperlipidemia)     Hypothyroidism     Intermittent self-catheterization of bladder     MVP (mitral valve prolapse)     Neurogenic bladder      Objective:      Physical Exam   Constitutional: She is oriented to person, place, and time. She appears well-developed and well-nourished.   HENT:   Head: Normocephalic and atraumatic.   Right Ear: Hearing, tympanic membrane, external ear and ear canal normal.   Left Ear: Hearing, tympanic membrane, external ear and ear canal normal.   Nose: Nose normal. No mucosal edema, rhinorrhea or sinus tenderness. Right sinus exhibits no maxillary sinus tenderness and no frontal sinus tenderness. Left sinus exhibits no maxillary sinus tenderness and no frontal sinus tenderness.   Mouth/Throat: Uvula is midline, oropharynx is clear and moist and mucous membranes are normal. No oropharyngeal exudate or posterior oropharyngeal edema.   Eyes: Conjunctivae and EOM are normal. Pupils are equal, round, and reactive to light.   Neck: Normal range of motion. Neck supple.   Cardiovascular: Normal rate, regular rhythm, normal heart sounds and intact distal pulses. Exam reveals no friction rub.   No murmur heard.  Pulmonary/Chest: Effort normal. No stridor. No apnea and no tachypnea. No respiratory distress. She has wheezes. She has no rales.   Airway tightness in chest      Abdominal: Soft. Bowel sounds are normal.   Musculoskeletal: Normal range of motion. She exhibits no edema or tenderness.   Neurological: She is alert and oriented to person, place, and time. No cranial nerve deficit. Coordination normal.   Skin: Skin is warm and dry.    Psychiatric: She has a normal mood and affect. Her behavior is normal. Judgment and thought content normal.   Nursing note and vitals reviewed.      Assessment:       1. Bronchitis    2. Malignant neoplasm of urinary bladder, unspecified site    3. Coronary artery disease involving native coronary artery of native heart with angina pectoris with documented spasm    4. PAF (paroxysmal atrial fibrillation)    5. Acute exacerbation of chronic bronchitis    6. Hypothyroidism due to acquired atrophy of thyroid        Plan:       Acute exacerbation of chronic bronchitis  Bronchitis  -     predniSONE (DELTASONE) 10 MG tablet; Take 4 tablets for 3 days then 3 tablet for 3 days then 2 tablets for 3 days then 1 tablet for 3 days  Dispense: 30 tablet; Refill: 0    Malignant neoplasm of urinary bladder, unspecified site  Follows with every 6 adam cysto - Dr Sigala     Coronary artery disease involving native coronary artery of native heart with angina pectoris with documented spasm  Follows with cardiology     PAF (paroxysmal atrial fibrillation)  Pacer and on betapace- follows with Cardio    Hypothyroidism due to acquired atrophy of thyroid  On meds - stable         Fu if not better or any increased symptoms

## 2019-02-26 NOTE — PROGRESS NOTES
Physical Therapy Daily Treatment Note     Name: [facility] Number: [id number]    Therapy Diagnosis:   Encounter Diagnoses   Name Primary?    Shoulder weakness     Neck pain on right side     Neck stiffness      Physician: Hany Adan MD    Visit Date: 2/28/2019  Physician Orders: PT Eval and Treat   Medical Diagnosis from Referral: Neck pain, DDD (degenerative disc disease), cervical, Cervical radiculopathy due to degenerative joint disease of spine   Evaluation Date: 1/31/2019  Authorization Period Expiration: 03/17/2019   Plan of Care Expiration: 03/28/2019  Visit # / Visits authorized: 8/12    Time In: 700  Time Out: 752  Total Billable Time: 52 minutes    Precautions: Standard    Subjective     Pt reports: She had a little bit of pain after sweeping and cleaning her house, but not a lot. She reports she is feeling much better since beginning PT and is ready for discharge.  She was compliant with home exercise program.  Response to previous treatment: Muscle soreness  Functional change: Decreased pain    Pain: 0/10   Location: N/A     Objective     Jackie received therapeutic exercises to develop strength, ROM and posture for 32 minutes including:  Chin tucks 2 x 10 (3 second hold)  Corner stretch 3 x 30 seconds  Scap retractions 3 x 10 GTB   Shoulder extensions 3 x 10 GTB  Pulleys flexion/ abduction x 3'/3'  Supine T's and X's 2 x 10 GTB  (B) Shoulder IR 2 x 10 GTB  (B) Shoulder ER 2 x 10 GTB   Scaption 2# 2 x 10   B Shoulder external rotation with scapular retrcaction 2 x 10  GTB  Chin Tucks with rotation in supine within pain free range x 10 - NP     Jackie received the following manual therapy techniques: Manual traction and Soft tissue Mobilization were applied to the: cervical spine for 10 minutes, including:  STM to (B) UT, LS, cervical paraspinals  Suboccipital release   Cervical distraction     Home Exercises Provided and Patient Education Provided     Education provided:    - Continuing with HEP     Written Home Exercises Provided: yes.  Exercises were reviewed and Jackie was able to demonstrate them prior to the end of the session.  Jackie demonstrated good  understanding of the education provided.     See EMR under Media for exercises provided 2/28/2019.    Assessment     Jackie tolerated treatment well. She has made significant progress toward her goals and is able to perform her hobbies pain free. Issued updated HEP. She is to be discharged from PT at this time.     Jackie is progressing well towards her goals.   Pt prognosis is Good.     Pt will continue to benefit from skilled outpatient physical therapy to address the deficits listed in the problem list box on initial evaluation, provide pt/family education and to maximize pt's level of independence in the home and community environment.     Pt's spiritual, cultural and educational needs considered and pt agreeable to plan of care and goals.    Anticipated barriers to physical therapy: None    Goals:   Short Term Goals (4 Weeks):   1. Pt to increase strength by 1/2 muscle grade of muscles tested to allow for improvement in functional activities such as performing sewing/gardening/going to gym. - Met 2/28/2019   2. Pt to improve gross cervical motion in rotation by 25% to allow for improved functional mobility. In progress, not met  3. Patient to demonstrate good seated posture for >5 minutes with no verbal cueing. -  In progress, not met  4. Pt to report compliance with HEP 3x/week and demonstrate proper exercise technique to PT to show competence with self management of condition.  - Met 2/28/2019    Long Term Goals (8 Weeks):   1. Pt to achieve <40% limitation as measured by the FOTO to demonstrate decreased disability. - met 2/28/2019  2. Pt to increase strength to 5/5 of muscles test to allow for improvement in functional activities such as performing sewing/gardening/going to gym. - in progress, not met  3. Pt to  improve gross spinal motion to < 10% limitations to allow for improved functional mobility with performing ADL's and hobbies such as sewing and gardening. - in progress, not met  4. Pt to report compliance with HEP 3x/week and demonstrate proper exercise technique to PT to show independence with self management of condition. -MET 2/28/2019      Plan     Discharge from PT     Yusuf Gonzalez, PT

## 2019-02-28 ENCOUNTER — CLINICAL SUPPORT (OUTPATIENT)
Dept: REHABILITATION | Facility: HOSPITAL | Age: 79
End: 2019-02-28
Payer: MEDICARE

## 2019-02-28 DIAGNOSIS — R29.898 SHOULDER WEAKNESS: ICD-10-CM

## 2019-02-28 DIAGNOSIS — M54.2 NECK PAIN ON RIGHT SIDE: ICD-10-CM

## 2019-02-28 DIAGNOSIS — M43.6 NECK STIFFNESS: ICD-10-CM

## 2019-02-28 PROCEDURE — 97140 MANUAL THERAPY 1/> REGIONS: CPT | Mod: PO

## 2019-02-28 PROCEDURE — G8980 MOBILITY D/C STATUS: HCPCS | Mod: CJ,PO

## 2019-02-28 PROCEDURE — 97110 THERAPEUTIC EXERCISES: CPT | Mod: PO

## 2019-02-28 NOTE — PLAN OF CARE
"[facility]  Physical Therapy Reassessment and Discharge Summary     Name: Jackie Lnada  Clinic Number: [id number]    Therapy Diagnosis:   Encounter Diagnoses   Name Primary?    Shoulder weakness     Neck pain on right side     Neck stiffness      Physician: Hany Adan MD    Visit Date: 2/28/2019  Physician Orders: PT Eval and Treat   Medical Diagnosis from Referral: Neck pain, DDD (degenerative disc disease), cervical, Cervical radiculopathy due to degenerative joint disease of spine   Evaluation Date: 1/31/2019  Authorization Period Expiration: 03/17/2019   Plan of Care Expiration: 03/28/2019  Visit # / Visits authorized: 8/12    Precautions: Standard    Subjective   Date of onset: About 10 years ago  History of current condition - Jackie reports: "I swept and mopped the house and I had very little pain after. I am much better than when I started PT."     Medical History:   Past Medical History:   Diagnosis Date    A-fib     ARDS (adult respiratory distress syndrome) 2000    Bladder cancer     s/p BCG, interferon; cancer free since 1999, had twice, 1994 first    Coronary artery disease     9/2016-Mild, moderate stenosis. Pre and post-stent of the LAD verified by FFR.    DDD (degenerative disc disease), cervical     Depression     Difficult intubation 10/2017    Had trauma and bruising to her mouth and tongue [facility] int[facility]    GERD (gastroesophageal reflux disease)     Hiatal hernia     HLD (hyperlipidemia)     Hypothyroidism     Intermittent self-catheterization of bladder     MVP (mitral valve prolapse)     Neurogenic bladder        Surgical History:   Jackie Landa  has a past surgical history that includes Appendectomy (1965); Ovarian cyst removal (1975); Hysterectomy (1965); Coronary angioplasty with stent (2006); bladder stimulator (2008); Cataract extraction w/ intraocular lens  implant, bilateral (2002); Esophagogastroduodenoscopy; " Colonoscopy; Fracture surgery (2009); Eye surgery (Bilateral); Breast biopsy (Right, 2001); Cardiac pacemaker placement (9/27/13); Cardiac pacemaker placement (09/27/2013); Cardiac catheterization (09/02/2016); radiofrequency neurotomy (06/11/2018); Cystoscopy (N/A, 1/28/2019); and Injection of botulinum toxin type A (N/A, 1/28/2019).    Medications:   Jackie has a current medication list which includes the following prescription(s): acetaminophen, albuterol, aspirin, breo ellipta, fluorouracil, hydrocodone-acetaminophen, hydrocortisone, levothyroxine, multivitamin, nitroglycerin, omeprazole, oxybutynin, prednisone, simvastatin, sotalol, tramadol, vitamin d, and vitamin e.    Allergies:   Review of patient's allergies indicates:   Allergen Reactions    Codeine Itching    Phenergan [promethazine] Itching    Bactrim [sulfamethoxazole-trimethoprim] Hives    Darcalma [ohmklm-nruyy-x.blue-sal-sodium] Other (See Comments)     tachycardia    Levaquin [levofloxacin] Rash and Other (See Comments)     headache    Pyridium [phenazopyridine] Rash        Imaging, CT scan films:   1. Multilevel degenerative change of the CS resulting in multilevel central canal and neuroforaminal stenosis with particularly pronounced right-sided neuroforaminal stenosis noted at C5-C6 and C6-C7.  Please correlate clinically for symptoms referable to the right C6 and C7 nerves.  2. Calcification of the transverse ligament adjacent to the odontoid process and possible erosions involving the right C4-C5 facet joint, findings which could be explained by CPPD.  Erosions of the right C4-C5 facet joint could also be explained by inflammatory arthritis or gout.     Prior Therapy: Yes, [facility] two/three years ago for her neck pain   Social History: lives alone  Occupation: Retired, likes to sew, work in yard, ride bike, work out at the gym  Prior Level of Function: Independent   Current Level of Function: Independent     Pain:  Current 2/10, worst  10/10, best 0/10   Location: right neck  and shoulder   Description: Aching and (constant)  Aggravating Factors: Looking down, gardening   Easing Factors: pain medication, ice, heating pad and rest    Pts goals: To get some relief and see if we can correct wrong    Objective     Observation: Sits with forward head posture    Posture: Forward head, increased thoracic kyphosis     Cervical Range of Motion:    % LImitation Pain   Flexion WFL No Pain     Extension 25%  No Pain     Right Rotation 50% Mild pain in R UT     Left Rotation 25% No Pain     Right Side Bending 50% pain   Left Side Bending 50% No pain      Shoulder Range of Motion:   Shoulder Left Right   Flexion WFL WFL   Abduction WFL WFL   ER WFL WFL   IR WFL WFL     Strength:    Upper Extremity Strength  (R) UE  (L) UE    Shoulder flexion: 4+/5 Shoulder flexion: 4+/5   Shoulder Abduction: 4/5 Shoulder abduction: 4/5   Shoulder ER 4+/5 Shoulder ER 4+/5   Shoulder IR 4+/5 Shoulder IR 4+/5   Elbow flexion: 4+/5 Elbow flexion: 4+/5   Elbow extension: 4+/5 Elbow extension: 4+/5   Wrist flexion: 4+/5 Wrist flexion: 4+/5   Wrist extension: 4+/5 Wrist extension: 4+/5    4/5 : 5/5         Special Tests:  Distraction +    Compression negative   Spurlings negative   Sharp-Oren Negative   VA test Negative     Lateral Flexion Alar Ligament Negative   DNF test Limited endurance     Negative (R) Empty Can     Reflexes:    Reflex Left Right   Bicep 2+ 2+   Tricep 2+ 2+   More Negative Negative       Joint Mobility: Moderate hypomobility cervical spine C4-C6 PA's and transverse glides    Palpation: Mild TTP at (R) UT, (R) LS    Sensation: Intact to LT     Flexibility: mild pec tightness    CMS Impairment/Limitation/Restriction for FOTO Cervical Spine Survey    Therapist reviewed FOTO scores for Jackie Landa on 2/28/2019.   FOTO documents entered into Cued - see Media section.    Limitation Score: 35%  Category: Mobility    Current : CK = at least 40%  but < 60% impaired, limited or restricted  Discharge: CJ = at least 20% but < 40% impaired, limited or restricted            TREATMENT   See Daily Note    Assessment   Jackie is a 78 y.o. female referred to outpatient Physical Therapy with a medical diagnosis of Neck pain, DDD (degenerative disc disease), cervical, Cervical radiculopathy due to degenerative joint disease of spine. Physical exam is consistent with cervical radiculopathy into the R shoulder. She has attended 8 visits since her initial evaluation on 1/31/2019. Jackie has made significant progress toward her goals including improvements with her posture, UE strength, ROM, pain, and functional mobility. She has been able to return to her hobbies including guitar playing and going to the gym without any pain. She has been compliant with her HEP and is independent with her symptom management. Educated patient about importance of continuing with HEP and focusing on posture throughout the day. Jackie is to be discharged from physical therapy at this time.     Pt prognosis is Good.   Pt will benefit from skilled outpatient Physical Therapy to address the deficits stated above and in the chart below, provide pt/family education, and to maximize pt's level of independence.     Plan of care discussed with patient: Yes  Pt's spiritual, cultural and educational needs considered and patient is agreeable to the plan of care and goals as stated below:     Anticipated Barriers for therapy: None    Medical Necessity is demonstrated by the following  History  Co-morbidities and personal factors that may impact the plan of care Co-morbidities:   advanced age, CAD, depression and bradycardia , dyslipidemia, overactive bladder    Personal Factors:   age     moderate   Examination  Body Structures and Functions, activity limitations and participation restrictions that may impact the plan of care Body Regions:   neck  upper extremities    Body Systems:     ROM  strength    Participation Restrictions:   Has to take frequent breaks while performing her hobbies (gardening, sewing)    Activity limitations:   Learning and applying knowledge  no deficits    General Tasks and Commands  no deficits    Communication  hard of hearing    Mobility  lifting and carrying objects    Self care  no deficits    Domestic Life  doing house work (cleaning house, washing dishes, laundry)    Interactions/Relationships  no deficits    Life Areas  no deficits    Community and Social Life  recreation and leisure         moderate   Clinical Presentation evolving clinical presentation with changing clinical characteristics moderate   Decision Making/ Complexity Score: moderate     Goals:  Short Term Goals (4 Weeks):   1. Pt to increase strength by 1/2 muscle grade of muscles tested to allow for improvement in functional activities such as performing sewing/gardening/going to gym. - Met 2/28/2019   2. Pt to improve gross cervical motion in rotation by 25% to allow for improved functional mobility. In progress, not met  3. Patient to demonstrate good seated posture for >5 minutes with no verbal cueing. -  In progress, not met  4. Pt to report compliance with HEP 3x/week and demonstrate proper exercise technique to PT to show competence with self management of condition.  - Met 2/28/2019    Long Term Goals (8 Weeks):   1. Pt to achieve <40% limitation as measured by the FOTO to demonstrate decreased disability. - met 2/28/2019  2. Pt to increase strength to 5/5 of muscles test to allow for improvement in functional activities such as performing sewing/gardening/going to gym. - in progress, not met  3. Pt to improve gross spinal motion to < 10% limitations to allow for improved functional mobility with performing ADL's and hobbies such as sewing and gardening. - in progress, not met  4. Pt to report compliance with HEP 3x/week and demonstrate proper exercise technique to PT to show independence  with self management of condition. -MET 2/28/2019    Plan   Discharge from PT     Yusuf Gonzalez, PT, DPT  02/28/2019

## 2019-03-08 ENCOUNTER — CLINICAL SUPPORT (OUTPATIENT)
Dept: FAMILY MEDICINE | Facility: CLINIC | Age: 79
End: 2019-03-08
Payer: MEDICARE

## 2019-03-08 VITALS — HEART RATE: 60 BPM | SYSTOLIC BLOOD PRESSURE: 106 MMHG | DIASTOLIC BLOOD PRESSURE: 70 MMHG

## 2019-03-08 DIAGNOSIS — I10 HYPERTENSION, UNSPECIFIED TYPE: Primary | ICD-10-CM

## 2019-03-08 PROCEDURE — 99999 PR PBB SHADOW E&M-EST. PATIENT-LVL III: ICD-10-PCS | Mod: PBBFAC,,,

## 2019-03-08 PROCEDURE — 99499 UNLISTED E&M SERVICE: CPT | Mod: S$GLB,,, | Performed by: FAMILY MEDICINE

## 2019-03-08 PROCEDURE — 99499 NO LOS: ICD-10-PCS | Mod: S$GLB,,, | Performed by: FAMILY MEDICINE

## 2019-03-08 PROCEDURE — 99999 PR PBB SHADOW E&M-EST. PATIENT-LVL III: CPT | Mod: PBBFAC,,,

## 2019-03-08 RX ORDER — OMEPRAZOLE 20 MG/1
20 CAPSULE, DELAYED RELEASE ORAL DAILY PRN
Qty: 90 CAPSULE | Refills: 3 | Status: SHIPPED | OUTPATIENT
Start: 2019-03-08 | End: 2020-01-08 | Stop reason: SDUPTHER

## 2019-03-08 NOTE — TELEPHONE ENCOUNTER
Pt in clinic today for B/P check. Please note nurse visit note. Pt requesting refill for noted medication, pt also requesting to have cholesterol checked at her Lab appt this month  and also instructed pt to contact cardiologist via My Chart in regards to pacemaker questions as pt states she is concerned with it picking up WIFI?. Please advise. Thank you. CLC

## 2019-03-08 NOTE — PROGRESS NOTES
Jackie Landa 78 y.o. female is here today for Blood Pressure check.   History of HTN yes.    Review of patient's allergies indicates:   Allergen Reactions    Codeine Itching    Phenergan [promethazine] Itching    Bactrim [sulfamethoxazole-trimethoprim] Hives    Darcalma [dezzar-ivues-b.blue-sal-sodium] Other (See Comments)     tachycardia    Levaquin [levofloxacin] Rash and Other (See Comments)     headache    Pyridium [phenazopyridine] Rash     Creatinine   Date Value Ref Range Status   12/28/2018 1.1 0.5 - 1.4 mg/dL Final     Sodium   Date Value Ref Range Status   12/28/2018 142 136 - 145 mmol/L Final     Potassium   Date Value Ref Range Status   12/28/2018 4.8 3.5 - 5.1 mmol/L Final   ]  Patient verifies taking blood pressure medications on a regular basis at the same time of the day.     Current Outpatient Medications:     acetaminophen (TYLENOL) 325 MG tablet, Take 1 tablet (325 mg total) by mouth every 6 (six) hours as needed for Pain., Disp: , Rfl: 0    albuterol (VENTOLIN HFA) 90 mcg/actuation inhaler, INHALE 2 PUFFS INTO THE LUNGS EVERY 4 HOURS AS NEEDED FOR WHEEZING OR SHORTNESS OF BREATH (Patient taking differently: Inhale 1 puff into the lungs once daily. INHALE 2 PUFFS INTO THE LUNGS EVERY 4 HOURS AS NEEDED FOR WHEEZING OR SHORTNESS OF BREATH. BRING TO HOSPITAL DAY OF SURGERY.), Disp: 1 Inhaler, Rfl: 11    aspirin (ECOTRIN) 81 MG EC tablet, Take 81 mg by mouth every evening. , Disp: , Rfl:     BREO ELLIPTA 100-25 mcg/dose diskus inhaler, Inhale 1 puff into the lungs once daily. USE AM OF SURGERY., Disp: , Rfl:     fluorouracil (EFUDEX) 5 % cream, Apply topically 2 (two) times daily. (Patient taking differently: Apply topically 2 (two) times daily as needed. ), Disp: 40 g, Rfl: 1    HYDROcodone-acetaminophen (NORCO) 5-325 mg per tablet, Take 1 tablet by mouth. CAN USE AM OF SURGERY WITH A SIP OF WATER IF NEEDED., Disp: , Rfl:     hydrocortisone 2.5 % cream, Apply topically 2 (two)  times daily. (Patient taking differently: Apply topically 2 (two) times daily. DO NOT USE AM OF SURGERY.), Disp: 28 g, Rfl: 1    levothyroxine (SYNTHROID) 88 MCG tablet, TAKE 1 TABLET EVERY DAY (Patient taking differently: TAKE 1 TABLET EVERY DAY. USE AM OF SURGERY WITH A SIP OF WATER.), Disp: 90 tablet, Rfl: 0    multivitamin (ONE DAILY MULTIVITAMIN) per tablet, Take 1 tablet by mouth every evening. USE PM BEFORE SURGERY., Disp: , Rfl:     nitroGLYCERIN (NITROSTAT) 0.4 MG SL tablet, Place 1 tablet (0.4 mg total) under the tongue every 5 (five) minutes as needed for Chest pain. (Patient taking differently: Place 0.4 mg under the tongue every 5 (five) minutes as needed for Chest pain. USE AS DIRECTED.), Disp: 25 tablet, Rfl: 11    omeprazole (PRILOSEC) 20 MG capsule, Take 20 mg by mouth daily as needed. USE AM OF SURGEY WITH A SIP OF WATER., Disp: , Rfl:     oxybutynin (DITROPAN XL) 15 MG TR24, TAKE 1 TABLET DAILY (Patient taking differently: TAKE 1 TABLET DAILY. USE AM OF SURGERY WITH A SIP OF WATER.), Disp: 90 tablet, Rfl: 1    simvastatin (ZOCOR) 40 MG tablet, Take 1 tablet (40 mg total) by mouth every evening. (Patient taking differently: Take 40 mg by mouth every evening. USE PM BEFORE SURGERY AND A DOSE AM OF SURGERY WITH A SIP OF WATER.), Disp: 90 tablet, Rfl: 3    sotalol (BETAPACE) 80 MG tablet, 40 mg BID (Patient taking differently: Take 40 mg by mouth 2 (two) times daily. USE PM BEFORE SURGERY AND AM OF SURGERY WITH A SIP OF WATER), Disp: 180 tablet, Rfl: 4    traMADol (ULTRAM) 50 mg tablet, Take 1 tablet (50 mg total) by mouth every 6 (six) hours as needed for Pain. (Patient taking differently: Take 50 mg by mouth every 6 (six) hours as needed for Pain. USE AM OF SURGERY WIT A SIP OF WATER IF NEEDED.), Disp: 30 tablet, Rfl: 0    vitamin D (VITAMIN D3) 1000 units Tab, Take 1,000 Units by mouth once daily. DO NOT USE AM OF SURGERY., Disp: , Rfl:     vitamin E 400 UNIT capsule, Take 400 Units by  mouth once daily. DO NOT USE AM OF SURGERY., Disp: , Rfl:   Does patient have record of home blood pressure readings no. Readings have been averaging 160/100.   Last dose of blood pressure medication was taken this AM.  Patient is asymptomatic.   Complains of N/A.    BP: 106/70 , Pulse: 60 .    Blood pressure reading after 15 minutes was N/A.  Dr. Adan notified.

## 2019-03-18 ENCOUNTER — PATIENT MESSAGE (OUTPATIENT)
Dept: FAMILY MEDICINE | Facility: CLINIC | Age: 79
End: 2019-03-18

## 2019-03-26 ENCOUNTER — LAB VISIT (OUTPATIENT)
Dept: LAB | Facility: HOSPITAL | Age: 79
End: 2019-03-26
Attending: FAMILY MEDICINE
Payer: MEDICARE

## 2019-03-26 DIAGNOSIS — E03.4 HYPOTHYROIDISM DUE TO ACQUIRED ATROPHY OF THYROID: ICD-10-CM

## 2019-03-26 LAB — TSH SERPL DL<=0.005 MIU/L-ACNC: 0.42 UIU/ML (ref 0.4–4)

## 2019-03-26 PROCEDURE — 36415 COLL VENOUS BLD VENIPUNCTURE: CPT | Mod: PO

## 2019-03-26 PROCEDURE — 84443 ASSAY THYROID STIM HORMONE: CPT

## 2019-04-08 ENCOUNTER — CLINICAL SUPPORT (OUTPATIENT)
Dept: CARDIOLOGY | Facility: CLINIC | Age: 79
End: 2019-04-08
Attending: INTERNAL MEDICINE
Payer: MEDICARE

## 2019-04-08 DIAGNOSIS — Z95.0 CARDIAC PACEMAKER IN SITU: ICD-10-CM

## 2019-04-08 DIAGNOSIS — R00.1 BRADYCARDIA: ICD-10-CM

## 2019-04-19 DIAGNOSIS — E03.4 HYPOTHYROIDISM DUE TO ACQUIRED ATROPHY OF THYROID: ICD-10-CM

## 2019-04-19 RX ORDER — LEVOTHYROXINE SODIUM 88 UG/1
TABLET ORAL
Qty: 90 TABLET | Refills: 3 | Status: SHIPPED | OUTPATIENT
Start: 2019-04-19 | End: 2020-05-07 | Stop reason: SDUPTHER

## 2019-05-01 DIAGNOSIS — R00.1 BRADYCARDIA: ICD-10-CM

## 2019-05-01 DIAGNOSIS — Z95.0 CARDIAC PACEMAKER IN SITU: Primary | ICD-10-CM

## 2019-07-08 ENCOUNTER — CLINICAL SUPPORT (OUTPATIENT)
Dept: CARDIOLOGY | Facility: CLINIC | Age: 79
End: 2019-07-08
Attending: INTERNAL MEDICINE
Payer: MEDICARE

## 2019-07-08 DIAGNOSIS — R00.1 BRADYCARDIA: ICD-10-CM

## 2019-07-08 DIAGNOSIS — Z95.0 CARDIAC PACEMAKER IN SITU: ICD-10-CM

## 2019-07-15 ENCOUNTER — PATIENT MESSAGE (OUTPATIENT)
Dept: FAMILY MEDICINE | Facility: CLINIC | Age: 79
End: 2019-07-15

## 2019-07-15 ENCOUNTER — TELEPHONE (OUTPATIENT)
Dept: FAMILY MEDICINE | Facility: CLINIC | Age: 79
End: 2019-07-15

## 2019-07-15 RX ORDER — OXYBUTYNIN CHLORIDE 15 MG/1
15 TABLET, EXTENDED RELEASE ORAL DAILY
Qty: 90 TABLET | Refills: 3 | Status: SHIPPED | OUTPATIENT
Start: 2019-07-15 | End: 2020-07-19 | Stop reason: SDUPTHER

## 2019-07-15 RX ORDER — SIMVASTATIN 40 MG/1
40 TABLET, FILM COATED ORAL NIGHTLY
Qty: 90 TABLET | Refills: 4 | Status: SHIPPED | OUTPATIENT
Start: 2019-07-15 | End: 2020-08-25 | Stop reason: SDUPTHER

## 2019-07-15 NOTE — TELEPHONE ENCOUNTER
----- Message from Kody Molina sent at 7/15/2019 10:40 AM CDT -----  Contact: people   Type:  RX Refill Request    Who Called:  [facility] - Refill or New Rx:    RX Name and Strength:  Catheter supplies straigh tip urine catheter   How is the patient currently taking it? (ex. 1XDay):    Is this a 30 day or 90 day RX:  90 day supply with additional refills   Preferred Pharmacy with phone number:  [facility]  Local or Mail Order:  Local   Ordering Provider:    Best Call Back Number:  [phone]/fax [phone]  Additional Information:  The office need a signed order with refills along with patient's clinical notes. Patient have a less than 10 day supply

## 2019-07-15 NOTE — PROGRESS NOTES
Refill Authorization Note     is requesting a refill authorization.    Brief assessment and rationale for refill: ROUTE: OP  Name and strength of medication: OXYBUTYNIN CL ER 15 MG TABLET            Medication reconciliation completed: No              How patient will take medication: t1t qd          Comments: APPOINTMENTS (past 12m or future 3m authorizing provider)  LAST VISIT DATE  Hany Adan MD 12/27/2018         NEXT VISIT DATE  Hany Adan MD Visit date not found

## 2019-07-16 NOTE — TELEPHONE ENCOUNTER
Spoke with pt and informed her that her request is currently being worked on, she verbalized understanding

## 2019-07-16 NOTE — TELEPHONE ENCOUNTER
Please get Laura with [facility] to write up the correct order and get all the required documentation together for this patient's urinary catheters together for me to sign and hopefully send off today.

## 2019-07-16 NOTE — TELEPHONE ENCOUNTER
----- Message from Yenny Castro sent at 7/16/2019  9:28 AM CDT -----  Type: Needs Medical Advice    Who Called:  Patient  Pharmacy name and phone #:  [facility]  Best Call Back Number: [phone] (home)     Additional Information: Patient sent an email to office yesterday concerning getting another prescription for her catheters to be sent to [facility]. She does not have much left and is getting worried that she has not heard from office as of yet. Please call when remitted to let her know.

## 2019-07-19 RX ORDER — SOTALOL HYDROCHLORIDE 80 MG/1
TABLET ORAL
Qty: 90 TABLET | Refills: 4 | Status: SHIPPED | OUTPATIENT
Start: 2019-07-19 | End: 2020-08-17 | Stop reason: SDUPTHER

## 2019-08-08 ENCOUNTER — TELEPHONE (OUTPATIENT)
Dept: CARDIOLOGY | Facility: CLINIC | Age: 79
End: 2019-08-08

## 2019-08-08 NOTE — TELEPHONE ENCOUNTER
----- Message from Laurie Chaves sent at 8/8/2019  9:49 AM CDT -----  Contact: Patient  Type:  Patient Returning Call    Who Called:  Patient  Who Left Message for Patient:  Nurse  Does the patient know what this is regarding?:  To reschedule her appt in Sept  Best Call Back Number:   Additional Information:  I tried to reschedule the appt for her but she didn't want 12/24/19. she wants the Nurse to reschedule for a sooner date. Call to pod. No answer.

## 2019-09-10 ENCOUNTER — TELEPHONE (OUTPATIENT)
Dept: CARDIOLOGY | Facility: CLINIC | Age: 79
End: 2019-09-10

## 2019-09-10 NOTE — TELEPHONE ENCOUNTER
Spoke to pt. Informed her labs done is not the same as what Dr. Patterson ordered. Pt. Verbalized understanding.

## 2019-09-10 NOTE — TELEPHONE ENCOUNTER
----- Message from Karlene Gonsalez sent at 9/10/2019  8:27 AM CDT -----  Contact: Patient  Type: Needs Medical Advice    Who Called:  Patient  Best Call Back Number:   Additional Information: Patient was just in the hospital 9/5/19 and is saying that she had several labs done during her stay. Patient is scheduled for fasting labs on 9/16/19 and wants to make sure they are not repeats of labs that she already had completed in the hospital. Wants to speak to the nurse.

## 2019-09-16 ENCOUNTER — CLINICAL SUPPORT (OUTPATIENT)
Dept: CARDIOLOGY | Facility: CLINIC | Age: 79
End: 2019-09-16
Attending: INTERNAL MEDICINE
Payer: MEDICARE

## 2019-09-16 ENCOUNTER — HOSPITAL ENCOUNTER (OUTPATIENT)
Dept: RADIOLOGY | Facility: HOSPITAL | Age: 79
Discharge: HOME | End: 2019-09-16
Attending: NURSE PRACTITIONER
Payer: MEDICARE

## 2019-09-16 VITALS
SYSTOLIC BLOOD PRESSURE: 152 MMHG | BODY MASS INDEX: 24.57 KG/M2 | WEIGHT: 143.94 LBS | HEART RATE: 61 BPM | HEIGHT: 64 IN | DIASTOLIC BLOOD PRESSURE: 80 MMHG

## 2019-09-16 DIAGNOSIS — E78.5 DYSLIPIDEMIA: ICD-10-CM

## 2019-09-16 DIAGNOSIS — Z95.5 S/P CORONARY ARTERY STENT PLACEMENT: ICD-10-CM

## 2019-09-16 DIAGNOSIS — J98.4 DISEASE OF LUNG: ICD-10-CM

## 2019-09-16 DIAGNOSIS — J20.9 ACUTE EXACERBATION OF CHRONIC BRONCHITIS: ICD-10-CM

## 2019-09-16 DIAGNOSIS — J42 ACUTE EXACERBATION OF CHRONIC BRONCHITIS: ICD-10-CM

## 2019-09-16 PROCEDURE — 71046 X-RAY EXAM CHEST 2 VIEWS: CPT | Mod: TC,FY,PO

## 2019-09-16 PROCEDURE — 99999 PR PBB SHADOW E&M-EST. PATIENT-LVL II: ICD-10-PCS | Mod: PBBFAC,,,

## 2019-09-16 PROCEDURE — 93306 TTE W/DOPPLER COMPLETE: CPT | Mod: S$GLB,,, | Performed by: INTERNAL MEDICINE

## 2019-09-16 PROCEDURE — 71046 XR CHEST PA AND LATERAL: ICD-10-PCS | Mod: 26,,, | Performed by: RADIOLOGY

## 2019-09-16 PROCEDURE — 99999 PR PBB SHADOW E&M-EST. PATIENT-LVL II: CPT | Mod: PBBFAC,,,

## 2019-09-16 PROCEDURE — 93306 TRANSTHORACIC ECHO (TTE) COMPLETE: ICD-10-PCS | Mod: S$GLB,,, | Performed by: INTERNAL MEDICINE

## 2019-09-16 PROCEDURE — 71046 X-RAY EXAM CHEST 2 VIEWS: CPT | Mod: 26,,, | Performed by: RADIOLOGY

## 2019-09-17 LAB
ASCENDING AORTA: 3.01 CM
AV INDEX (PROSTH): 0.79
AV MEAN GRADIENT: 4 MMHG
AV PEAK GRADIENT: 6 MMHG
AV VALVE AREA: 2.78 CM2
AV VELOCITY RATIO: 0.89
BSA FOR ECHO PROCEDURE: 1.72 M2
CV ECHO LV RWT: 0.41 CM
DOP CALC AO PEAK VEL: 1.22 M/S
DOP CALC AO VTI: 34.28 CM
DOP CALC LVOT AREA: 3.5 CM2
DOP CALC LVOT DIAMETER: 2.11 CM
DOP CALC LVOT PEAK VEL: 1.08 M/S
DOP CALC LVOT STROKE VOLUME: 95.13 CM3
DOP CALCLVOT PEAK VEL VTI: 27.22 CM
E WAVE DECELERATION TIME: 167.06 MSEC
E/A RATIO: 1.28
E/E' RATIO: 10.75 M/S
ECHO LV POSTERIOR WALL: 0.83 CM (ref 0.6–1.1)
FRACTIONAL SHORTENING: 26 % (ref 28–44)
INTERVENTRICULAR SEPTUM: 0.96 CM (ref 0.6–1.1)
IVRT: 0.12 MSEC
LA MAJOR: 4.65 CM
LA MINOR: 4.74 CM
LA WIDTH: 3.77 CM
LEFT ATRIUM SIZE: 3.87 CM
LEFT ATRIUM VOLUME INDEX: 34.2 ML/M2
LEFT ATRIUM VOLUME: 58.22 CM3
LEFT INTERNAL DIMENSION IN SYSTOLE: 2.99 CM (ref 2.1–4)
LEFT VENTRICLE DIASTOLIC VOLUME INDEX: 42.39 ML/M2
LEFT VENTRICLE DIASTOLIC VOLUME: 72.12 ML
LEFT VENTRICLE MASS INDEX: 65 G/M2
LEFT VENTRICLE SYSTOLIC VOLUME INDEX: 20.3 ML/M2
LEFT VENTRICLE SYSTOLIC VOLUME: 34.58 ML
LEFT VENTRICULAR INTERNAL DIMENSION IN DIASTOLE: 4.05 CM (ref 3.5–6)
LEFT VENTRICULAR MASS: 111.05 G
LV LATERAL E/E' RATIO: 8.6 M/S
LV SEPTAL E/E' RATIO: 14.33 M/S
MV PEAK A VEL: 0.67 M/S
MV PEAK E VEL: 0.86 M/S
PISA TR MAX VEL: 3.08 M/S
PULM VEIN S/D RATIO: 1.4
PV PEAK D VEL: 0.67 M/S
PV PEAK S VEL: 0.94 M/S
RA MAJOR: 4.79 CM
RA PRESSURE: 3 MMHG
RA WIDTH: 3.4 CM
RIGHT VENTRICULAR END-DIASTOLIC DIMENSION: 3.36 CM
RV TISSUE DOPPLER FREE WALL SYSTOLIC VELOCITY 1 (APICAL 4 CHAMBER VIEW): 9.97 CM/S
SINUS: 2.84 CM
STJ: 2.85 CM
TDI LATERAL: 0.1 M/S
TDI SEPTAL: 0.06 M/S
TDI: 0.08 M/S
TR MAX PG: 38 MMHG
TRICUSPID ANNULAR PLANE SYSTOLIC EXCURSION: 1.72 CM
TV REST PULMONARY ARTERY PRESSURE: 41 MMHG

## 2019-09-23 ENCOUNTER — PATIENT MESSAGE (OUTPATIENT)
Dept: CARDIOLOGY | Facility: CLINIC | Age: 79
End: 2019-09-23

## 2019-09-28 ENCOUNTER — TELEPHONE (OUTPATIENT)
Dept: FAMILY MEDICINE | Facility: CLINIC | Age: 79
End: 2019-09-28

## 2019-09-30 ENCOUNTER — OFFICE VISIT (OUTPATIENT)
Dept: CARDIOLOGY | Facility: CLINIC | Age: 79
End: 2019-09-30
Payer: MEDICARE

## 2019-09-30 VITALS
WEIGHT: 143.75 LBS | SYSTOLIC BLOOD PRESSURE: 109 MMHG | HEART RATE: 60 BPM | BODY MASS INDEX: 24.54 KG/M2 | DIASTOLIC BLOOD PRESSURE: 70 MMHG | HEIGHT: 64 IN

## 2019-09-30 DIAGNOSIS — Z95.0 CARDIAC PACEMAKER IN SITU: Chronic | ICD-10-CM

## 2019-09-30 DIAGNOSIS — I25.111 CORONARY ARTERY DISEASE INVOLVING NATIVE CORONARY ARTERY OF NATIVE HEART WITH ANGINA PECTORIS WITH DOCUMENTED SPASM: ICD-10-CM

## 2019-09-30 DIAGNOSIS — Z95.5 S/P CORONARY ARTERY STENT PLACEMENT: Primary | ICD-10-CM

## 2019-09-30 DIAGNOSIS — I48.0 PAF (PAROXYSMAL ATRIAL FIBRILLATION): Chronic | ICD-10-CM

## 2019-09-30 PROCEDURE — 99499 RISK ADDL DX/OHS AUDIT: ICD-10-PCS | Mod: S$GLB,,, | Performed by: INTERNAL MEDICINE

## 2019-09-30 PROCEDURE — 99214 PR OFFICE/OUTPT VISIT, EST, LEVL IV, 30-39 MIN: ICD-10-PCS | Mod: S$GLB,,, | Performed by: INTERNAL MEDICINE

## 2019-09-30 PROCEDURE — 99214 OFFICE O/P EST MOD 30 MIN: CPT | Mod: S$GLB,,, | Performed by: INTERNAL MEDICINE

## 2019-09-30 PROCEDURE — 1101F PR PT FALLS ASSESS DOC 0-1 FALLS W/OUT INJ PAST YR: ICD-10-PCS | Mod: CPTII,S$GLB,, | Performed by: INTERNAL MEDICINE

## 2019-09-30 PROCEDURE — 99999 PR PBB SHADOW E&M-EST. PATIENT-LVL III: CPT | Mod: PBBFAC,,, | Performed by: INTERNAL MEDICINE

## 2019-09-30 PROCEDURE — 3078F PR MOST RECENT DIASTOLIC BLOOD PRESSURE < 80 MM HG: ICD-10-PCS | Mod: CPTII,S$GLB,, | Performed by: INTERNAL MEDICINE

## 2019-09-30 PROCEDURE — 3078F DIAST BP <80 MM HG: CPT | Mod: CPTII,S$GLB,, | Performed by: INTERNAL MEDICINE

## 2019-09-30 PROCEDURE — 3074F SYST BP LT 130 MM HG: CPT | Mod: CPTII,S$GLB,, | Performed by: INTERNAL MEDICINE

## 2019-09-30 PROCEDURE — 99999 PR PBB SHADOW E&M-EST. PATIENT-LVL III: ICD-10-PCS | Mod: PBBFAC,,, | Performed by: INTERNAL MEDICINE

## 2019-09-30 PROCEDURE — 3074F PR MOST RECENT SYSTOLIC BLOOD PRESSURE < 130 MM HG: ICD-10-PCS | Mod: CPTII,S$GLB,, | Performed by: INTERNAL MEDICINE

## 2019-09-30 PROCEDURE — 1101F PT FALLS ASSESS-DOCD LE1/YR: CPT | Mod: CPTII,S$GLB,, | Performed by: INTERNAL MEDICINE

## 2019-09-30 PROCEDURE — 99499 UNLISTED E&M SERVICE: CPT | Mod: S$GLB,,, | Performed by: INTERNAL MEDICINE

## 2019-09-30 NOTE — PROGRESS NOTES
Subjective:    Patient ID:  Jackie Landa is a 79 y.o. female who presents for follow-up of No chief complaint on file.      HPI  Here for follow up of CAD-PCI (2006)/PPM. Patients states is doing well no chest pain, SOB or change in exertional tolerence. Patient is exercising regularly with out symptoms.     Review of Systems   Constitution: Negative for malaise/fatigue.   Eyes: Negative for blurred vision.   Cardiovascular: Negative for chest pain, claudication, cyanosis, dyspnea on exertion, irregular heartbeat, leg swelling, near-syncope, orthopnea, palpitations, paroxysmal nocturnal dyspnea and syncope.   Respiratory: Negative for cough and shortness of breath.    Hematologic/Lymphatic: Does not bruise/bleed easily.   Musculoskeletal: Negative for back pain, falls, joint pain, muscle cramps, muscle weakness and myalgias.   Gastrointestinal: Negative for abdominal pain, change in bowel habit, nausea and vomiting.   Genitourinary: Negative for urgency.   Neurological: Negative for dizziness, focal weakness and light-headedness.       Past Medical History:   Diagnosis Date    A-fib     ARDS (adult respiratory distress syndrome) 2000    on ventilator for 14 days    Asthmatic bronchitis with acute exacerbation 2/25/2017    Bladder cancer     s/p BCG, interferon; cancer free since 1999, had twice, 1994 first    Cancer of overlapping sites of bladder 10/10/2017    Cardiac pacemaker in situ 9/27/2013    10/13 [employer]      Coronary artery disease     9/2016-Mild, moderate stenosis. Pre and post-stent of the LAD verified by FFR.    Coronary artery disease involving native coronary artery of native heart with angina pectoris with documented spasm 2/22/2013 9/2016 [facility] Mild, moderate stenosis. Pre and post-stent of the LAD verified by FFR. Normal diastolic function and normal systolic function.    DDD (degenerative disc disease), cervical     Depression     Difficult intubation 10/2017     Had trauma and bruising to her mouth and tongue last intubation at [facility]    GERD (gastroesophageal reflux disease)     Hiatal hernia     HLD (hyperlipidemia)     Hypothyroidism     Intermittent self-catheterization of bladder     MVP (mitral valve prolapse)     Neurogenic bladder     Pacemaker     PAF (paroxysmal atrial fibrillation) 2/22/2013    On sotalol    S/P coronary artery stent placement - LAD 2006; Patent 09/2012 2/22/2013 2012 angio Patent LAD stent with 50-60 post stent dsx FFR 0.88     Urge incontinence of urine           Objective:     Vitals:    09/30/19 0946   BP: 109/70   Pulse: 60        Physical Exam   Constitutional: She is oriented to person, place, and time. She appears well-developed and well-nourished.   HENT:   Head: Normocephalic.   Eyes: Conjunctivae are normal.   Neck: Normal range of motion. Neck supple. No JVD present.   Cardiovascular: Normal rate, regular rhythm, normal heart sounds and intact distal pulses.   Pulses:       Carotid pulses are 2+ on the right side, and 2+ on the left side.       Radial pulses are 2+ on the right side, and 2+ on the left side.        Dorsalis pedis pulses are 2+ on the right side, and 2+ on the left side.        Posterior tibial pulses are 2+ on the right side, and 2+ on the left side.   Pacer site clean and dry, well healed.     Pulmonary/Chest: Effort normal and breath sounds normal.   Abdominal: Soft. Bowel sounds are normal.   Musculoskeletal: She exhibits no edema or tenderness.   Neurological: She is alert and oriented to person, place, and time. Gait normal.   Skin: Skin is warm, dry and intact. No cyanosis. Nails show no clubbing.   Psychiatric: She has a normal mood and affect. Her speech is normal and behavior is normal. Thought content normal.   Nursing note and vitals reviewed.            ..    Chemistry        Component Value Date/Time     09/16/2019 0844    K 4.5 09/16/2019 0844     09/16/2019 0844    CO2 30 (H)  09/16/2019 0844    BUN 17 09/16/2019 0844    CREATININE 0.9 09/16/2019 0844    GLU 89 09/16/2019 0844        Component Value Date/Time    CALCIUM 9.3 09/16/2019 0844    ALKPHOS 69 09/16/2019 0844    AST 28 09/16/2019 0844    ALT 19 09/16/2019 0844    BILITOT 1.5 (H) 09/16/2019 0844    ESTGFRAFRICA >60.0 09/16/2019 0844    EGFRNONAA >60.0 09/16/2019 0844            ..  Lab Results   Component Value Date    CHOL 158 09/16/2019    CHOL 171 12/28/2018    CHOL 164 05/18/2018     Lab Results   Component Value Date    HDL 60 09/16/2019    HDL 67 12/28/2018    HDL 62 05/18/2018     Lab Results   Component Value Date    LDLCALC 77.2 09/16/2019    LDLCALC 83.8 12/28/2018    LDLCALC 85.6 05/18/2018     Lab Results   Component Value Date    TRIG 104 09/16/2019    TRIG 101 12/28/2018    TRIG 82 05/18/2018     Lab Results   Component Value Date    CHOLHDL 38.0 09/16/2019    CHOLHDL 39.2 12/28/2018    CHOLHDL 37.8 05/18/2018     ..  Lab Results   Component Value Date    WBC 5.39 09/05/2019    HGB 12.8 09/05/2019    HCT 40.2 09/05/2019     (H) 09/05/2019     09/05/2019       Test(s) Reviewed  I have reviewed the following in detail:  [] Stress test   [] Angiography   [x] Echocardiogram   [x] Labs   [x] Pacer  4.5 years remaining longevity.  AP~83 %;  ~ 0 %  2 nsSVT events fastest 150s longest 3.5s.     Assessment:         ICD-10-CM ICD-9-CM   1. S/P coronary artery stent placement - LAD 2006; Patent 09/2012 Z95.5 V45.82   2. Cardiac pacemaker in situ Z95.0 V45.01   3. PAF (paroxysmal atrial fibrillation) I48.0 427.31   4. Coronary artery disease involving native coronary artery of native heart with angina pectoris with documented spasm I25.111 414.01     413.9     Problem List Items Addressed This Visit     S/P coronary artery stent placement - LAD 2006; Patent 09/2012 - Primary    Overview     2012 angio Patent LAD stent with 50-60 post stent dsx FFR 0.88         PAF (paroxysmal atrial fibrillation) (Chronic)     Overview     On sotalol         Coronary artery disease involving native coronary artery of native heart with angina pectoris with documented spasm    Overview     9/2016  [facility]  Mild, moderate stenosis. Pre and post-stent of the LAD verified by FFR.  Normal diastolic function and normal systolic function.         Cardiac pacemaker in situ (Chronic)    Overview     10/13 [employer]                  Plan:           Return to clinic 1 year   Low level/low impact aerobic exercise 5x's/wk. Heart healthy diet and risk factor modification.    See labs and med orders.      Portions of this note may have been created with voice recognition software.  Grammatical, syntax and spelling errors may be inevitable.

## 2019-12-03 ENCOUNTER — CLINICAL SUPPORT (OUTPATIENT)
Dept: CARDIOLOGY | Facility: CLINIC | Age: 79
End: 2019-12-03
Payer: MEDICARE

## 2019-12-03 PROCEDURE — 93294 REM INTERROG EVL PM/LDLS PM: CPT | Mod: ,,, | Performed by: INTERNAL MEDICINE

## 2019-12-03 PROCEDURE — 93294 CARDIAC DEVICE CHECK - REMOTE: ICD-10-PCS | Mod: ,,, | Performed by: INTERNAL MEDICINE

## 2019-12-03 PROCEDURE — 93296 REM INTERROG EVL PM/IDS: CPT | Mod: PBBFAC,PO | Performed by: INTERNAL MEDICINE

## 2019-12-20 DIAGNOSIS — I25.111 CORONARY ARTERY DISEASE INVOLVING NATIVE CORONARY ARTERY OF NATIVE HEART WITH ANGINA PECTORIS WITH DOCUMENTED SPASM: Primary | ICD-10-CM

## 2019-12-20 RX ORDER — NITROGLYCERIN 0.4 MG/1
0.4 TABLET SUBLINGUAL EVERY 5 MIN PRN
Qty: 25 TABLET | Refills: 4 | Status: SHIPPED | OUTPATIENT
Start: 2019-12-20 | End: 2020-12-19

## 2020-01-04 ENCOUNTER — PATIENT MESSAGE (OUTPATIENT)
Dept: FAMILY MEDICINE | Facility: CLINIC | Age: 80
End: 2020-01-04

## 2020-01-08 RX ORDER — OMEPRAZOLE 20 MG/1
20 CAPSULE, DELAYED RELEASE ORAL DAILY PRN
Qty: 90 CAPSULE | Refills: 0 | Status: SHIPPED | OUTPATIENT
Start: 2020-01-08 | End: 2021-03-10 | Stop reason: SDUPTHER

## 2020-01-08 NOTE — TELEPHONE ENCOUNTER
Please schedule patient for appointment:     Annual     Thanks!    Appointments past 12m or future 3m    Date Provider   Last Visit   12/27/2018 Hany Adan MD   Next Visit   Visit date not found Hany Adan MD

## 2020-01-08 NOTE — PROGRESS NOTES
Refill Authorization Note     is requesting a refill authorization.    Brief assessment and rationale for refill: APPROVE: needs appt(Follow-GERD)     Medication-related problems identified: Requires appointment    Medication Therapy Plan: GERD lco(2/18)                              Comments:   Requested Prescriptions   Pending Prescriptions Disp Refills    omeprazole (PRILOSEC) 20 MG capsule 90 capsule 0     Sig: Take 1 capsule (20 mg total) by mouth daily as needed. USE AM OF SURGEY WITH A SIP OF WATER.       Gastroenterology: Proton Pump Inhibitors Failed - 1/8/2020  7:31 AM        Failed - An appropriate indication is on the problem list        Failed - Office visit in past 6 months or future 90 days.     Recent Outpatient Visits            3 months ago S/P coronary artery stent placement - LAD 2006; Patent 09/2012    [facility] Cardiology Vincent Patterson MD    10 months ago Bronchitis    [facility] Barbara Fountain NP    1 year ago Contact dermatitis, unspecified contact dermatitis type, unspecified trigger    [facility] Dermatology Claudine Vizcarra MD    1 year ago Impetigo    [facility] Hany Adan MD    1 year ago Actinic keratosis    [facility] Dermatology Claudine Vizcarra MD          Future Appointments              In 1 month HOME MONITOR DEVICE CHECK, [facility] Cardiology Parvin    In 8 months LAB, [place] [facility] Parvin    In 8 months ECHO, [facility]    In 9 months Vincent Patterson MD [facility]                Passed - Patient is at least 18 years old        Passed - Osteoporosis is not on problem list        Passed - Plavix is not on active medication list

## 2020-01-10 ENCOUNTER — OFFICE VISIT (OUTPATIENT)
Dept: FAMILY MEDICINE | Facility: CLINIC | Age: 80
End: 2020-01-10
Payer: MEDICARE

## 2020-01-10 ENCOUNTER — LAB VISIT (OUTPATIENT)
Dept: LAB | Facility: HOSPITAL | Age: 80
End: 2020-01-10
Attending: FAMILY MEDICINE
Payer: MEDICARE

## 2020-01-10 VITALS
OXYGEN SATURATION: 95 % | TEMPERATURE: 98 F | WEIGHT: 142.63 LBS | HEART RATE: 95 BPM | HEIGHT: 64 IN | SYSTOLIC BLOOD PRESSURE: 110 MMHG | BODY MASS INDEX: 24.35 KG/M2 | DIASTOLIC BLOOD PRESSURE: 80 MMHG

## 2020-01-10 DIAGNOSIS — Z12.31 ENCOUNTER FOR SCREENING MAMMOGRAM FOR MALIGNANT NEOPLASM OF BREAST: ICD-10-CM

## 2020-01-10 DIAGNOSIS — Z78.0 MENOPAUSE: ICD-10-CM

## 2020-01-10 DIAGNOSIS — E03.4 HYPOTHYROIDISM DUE TO ACQUIRED ATROPHY OF THYROID: ICD-10-CM

## 2020-01-10 DIAGNOSIS — Z12.39 BREAST CANCER SCREENING: ICD-10-CM

## 2020-01-10 DIAGNOSIS — Z00.00 PREVENTATIVE HEALTH CARE: Primary | ICD-10-CM

## 2020-01-10 PROBLEM — J45.901 ASTHMATIC BRONCHITIS WITH ACUTE EXACERBATION: Status: RESOLVED | Noted: 2017-02-25 | Resolved: 2020-01-10

## 2020-01-10 PROBLEM — J42 ACUTE EXACERBATION OF CHRONIC BRONCHITIS: Status: RESOLVED | Noted: 2019-02-22 | Resolved: 2020-01-10

## 2020-01-10 PROBLEM — J20.9 ACUTE EXACERBATION OF CHRONIC BRONCHITIS: Status: RESOLVED | Noted: 2019-02-22 | Resolved: 2020-01-10

## 2020-01-10 PROCEDURE — 3079F DIAST BP 80-89 MM HG: CPT | Mod: CPTII,S$GLB,, | Performed by: FAMILY MEDICINE

## 2020-01-10 PROCEDURE — 3074F PR MOST RECENT SYSTOLIC BLOOD PRESSURE < 130 MM HG: ICD-10-PCS | Mod: CPTII,S$GLB,, | Performed by: FAMILY MEDICINE

## 2020-01-10 PROCEDURE — 99214 OFFICE O/P EST MOD 30 MIN: CPT | Mod: S$GLB,,, | Performed by: FAMILY MEDICINE

## 2020-01-10 PROCEDURE — 99499 UNLISTED E&M SERVICE: CPT | Mod: S$GLB,,, | Performed by: FAMILY MEDICINE

## 2020-01-10 PROCEDURE — 99214 PR OFFICE/OUTPT VISIT, EST, LEVL IV, 30-39 MIN: ICD-10-PCS | Mod: S$GLB,,, | Performed by: FAMILY MEDICINE

## 2020-01-10 PROCEDURE — 99999 PR PBB SHADOW E&M-EST. PATIENT-LVL V: ICD-10-PCS | Mod: PBBFAC,,, | Performed by: FAMILY MEDICINE

## 2020-01-10 PROCEDURE — 99499 RISK ADDL DX/OHS AUDIT: ICD-10-PCS | Mod: S$GLB,,, | Performed by: FAMILY MEDICINE

## 2020-01-10 PROCEDURE — 36415 COLL VENOUS BLD VENIPUNCTURE: CPT | Mod: PO

## 2020-01-10 PROCEDURE — 3074F SYST BP LT 130 MM HG: CPT | Mod: CPTII,S$GLB,, | Performed by: FAMILY MEDICINE

## 2020-01-10 PROCEDURE — 3079F PR MOST RECENT DIASTOLIC BLOOD PRESSURE 80-89 MM HG: ICD-10-PCS | Mod: CPTII,S$GLB,, | Performed by: FAMILY MEDICINE

## 2020-01-10 PROCEDURE — 99999 PR PBB SHADOW E&M-EST. PATIENT-LVL V: CPT | Mod: PBBFAC,,, | Performed by: FAMILY MEDICINE

## 2020-01-10 PROCEDURE — 84439 ASSAY OF FREE THYROXINE: CPT

## 2020-01-10 PROCEDURE — 84443 ASSAY THYROID STIM HORMONE: CPT

## 2020-01-10 NOTE — PROGRESS NOTES
Subjective:       Patient ID: Jackie Landa is a 79 y.o. female.    Chief Complaint: Annual Exam (wellness visit)    HPI Comments: Here for annual exam and f/u on chronic health issues.    Hypothyroidism - Synthroid 88mcg daily  HLD - Zocor 40mg daily  Afib with pacemaker - Sotolol 40mg BID; seeing cardiology annually  CAD - ASA 81mg daily  Depression - Wellbutrin XL 150mg daily (down from 300); stable on this  GERD with hiatal hernia - uses omeprazole 20mg daily PRN  Bladder cancer/neurogenic bladder - oxybutinin daily; self cathing 5-6 times daily; following with Dr. Sigala      Past Medical History:    Bladder cancer                                                  Comment:s/p BCG, interferon; cancer free since 2006    Coronary artery disease                                       Hypothyroidism                                                COPD (chronic obstructive pulmonary disease)                    Comment:pt has hx of ARDS-hospitalized 1mos    A-fib                                                         HLD (hyperlipidemia)                                          Neurogenic bladder                                            Depression                                                    Hiatal hernia                                                 MVP (mitral valve prolapse)                                   ARDS (adult respiratory distress syndrome)      2000          Past Surgical History:    BREAST SURGERY                                   2001            Comment:right breast lumpectomy    FRACTURE SURGERY                                 2009            Comment:right ankle    APPENDECTOMY                                     1965          OVARIAN CYST REMOVAL                             1975            Comment:left and right    EYE SURGERY                                                    HERNIA REPAIR                                                  HYSTERECTOMY                                      1965          CARDIAC PACEMAKER PLACEMENT                      9/27/13       CORONARY ANGIOPLASTY WITH STENT PLACEMENT        2006            Comment:LAD    bladder stimulator                               2008          CATARACT EXTRACTION W/ INTRAOCULAR LENS  IMPLA*  2002          Allergies:   -- Codeine -- Itching   -- Phenergan (Promethazine) -- Itching   -- Darcalma (Trlyts-Usmwe-J.Blue-Sal-Sodium) -- Other (See Comments)    --  tachycardia   -- Levaquin (Levofloxacin) -- Rash and Other (See Comments)    --  headache   -- Pyridium (Phenazopyridine) -- Rash    Social History    Marital Status:             Spouse Name:                       Years of Education:                 Number of children: 4             Occupational History  Occupation          Employer            Comment                                                 Social History Main Topics    Smoking Status: Former Smoker                   Packs/Day: 0.00  Years: 30         Types: Cigarettes      Quit date: 08/14/1987    Smokeless Status: Never Used                        Alcohol Use: Yes                Comment: one daily    Drug Use: No           Social History Narrative    Will be living with her sister    Current Outpatient Prescriptions on File Prior to Visit:  aspirin (ECOTRIN) 81 MG EC tablet, Take 81 mg by mouth once daily., Disp: , Rfl:   levothyroxine (SYNTHROID) 88 MCG tablet, Take 1 tablet (88 mcg total) by mouth once daily., Disp: 90 tablet, Rfl: 6  oxybutynin (DITROPAN XL) 15 MG TR24, Take 1 tablet (15 mg total) by mouth once daily., Disp: 90 tablet, Rfl: 6  simvastatin (ZOCOR) 40 MG tablet, Take 1 tablet (40 mg total) by mouth every evening., Disp: 90 tablet, Rfl: 6  sotalol (BETAPACE) 80 MG tablet, Take 0.5 tablets (40 mg total) by mouth 2 (two) times daily., Disp: 180 tablet, Rfl: 6  (DISCONTINUED) buPROPion (WELLBUTRIN XL) 150 MG TB24 tablet, Take 1 tablet (150 mg total) by mouth once daily. (Patient taking  differently: Take 300 mg by mouth once daily. ), Disp: 90 tablet, Rfl: 6  nitroGLYCERIN (NITROSTAT) 0.4 MG SL tablet, Place 1 tablet (0.4 mg total) under the tongue every 5 (five) minutes as needed for Chest pain., Disp: 30 tablet, Rfl: 12  (DISCONTINUED) solifenacin (VESICARE) 10 MG tablet, Take 1 tablet (10 mg total) by mouth once daily. Takes 1.5 tablets daily, Disp: 180 tablet, Rfl: 6    No current facility-administered medications on file prior to visit.      Review of patient's family history indicates:    Cancer                         Father                      Comment: adrenal    Cancer                         Paternal Aunt               Comment: breast      Prevent:  Colonoscopy UTD  FLU UTD  Zostavax done  Pneumovax done    Review of Systems   Constitutional: Negative for fever, chills, appetite change and unexpected weight change.   HENT: Negative for sore throat and trouble swallowing.    Eyes: Negative for pain and visual disturbance.   Respiratory: Negative for cough, shortness of breath and wheezing.    Cardiovascular: Negative for chest pain and palpitations.   Gastrointestinal: Negative for nausea, vomiting, abdominal pain, diarrhea and blood in stool.   Genitourinary: Negative for dysuria, hematuria and difficulty urinating.   Musculoskeletal: Negative for arthralgias, gait problem and neck pain.   Skin: Negative for rash and wound.   Neurological: Negative for dizziness, weakness, numbness and headaches.   Hematological: Negative for adenopathy.   Psychiatric/Behavioral: Negative for dysphoric mood.     Answers for HPI/ROS submitted by the patient on 1/9/2020   activity change: No  unexpected weight change: No  neck pain: No  hearing loss: No  rhinorrhea: No  trouble swallowing: No  eye discharge: No  visual disturbance: No  chest tightness: No  wheezing: No  chest pain: No  palpitations: No  blood in stool: No  constipation: No  vomiting: No  diarrhea: No  polydipsia: No  polyuria:  "No  difficulty urinating: No  hematuria: No  menstrual problem: No  dysuria: No  joint swelling: No  arthralgias: No  headaches: No  weakness: No  confusion: No  dysphoric mood: No        Objective:     /80 (BP Location: Left arm, Patient Position: Sitting)   Pulse 95   Temp 98.2 °F (36.8 °C) (Oral)   Ht 5' 4" (1.626 m)   Wt 64.7 kg (142 lb 10.2 oz)   LMP  (LMP Unknown)   SpO2 95%   BMI 24.48 kg/m²     Physical Exam   Constitutional: She is oriented to person, place, and time. She appears well-developed and well-nourished.   HENT:   Head: Normocephalic.   Mouth/Throat: Oropharynx is clear and moist. No oropharyngeal exudate or posterior oropharyngeal erythema.   Eyes: Conjunctivae and EOM are normal. Pupils are equal, round, and reactive to light.   Neck: Normal range of motion. Neck supple. No thyromegaly present.   Cardiovascular: Normal rate, regular rhythm, S1 normal, S2 normal, normal heart sounds and intact distal pulses.  Exam reveals no gallop and no friction rub.    No murmur heard.  Pulmonary/Chest: Effort normal and breath sounds normal. She has no wheezes. She has no rales.   Abdominal: Normal appearance.   Musculoskeletal:        Right lower leg: She exhibits no edema.        Left lower leg: She exhibits no edema.   Lymphadenopathy:     She has no cervical adenopathy.   Neurological: She is alert and oriented to person, place, and time. No cranial nerve deficit. Gait normal.   Skin: Skin is warm and intact. No rash noted.   Psychiatric: She has a normal mood and affect.       Results for orders placed or performed in visit on 09/16/19   Lipid panel   Result Value Ref Range    Cholesterol 158 120 - 199 mg/dL    Triglycerides 104 30 - 150 mg/dL    HDL 60 40 - 75 mg/dL    LDL Cholesterol 77.2 63.0 - 159.0 mg/dL    Hdl/Cholesterol Ratio 38.0 20.0 - 50.0 %    Total Cholesterol/HDL Ratio 2.6 2.0 - 5.0    Non-HDL Cholesterol 98 mg/dL   Comprehensive metabolic panel   Result Value Ref Range    " Sodium 144 136 - 145 mmol/L    Potassium 4.5 3.5 - 5.1 mmol/L    Chloride 106 95 - 110 mmol/L    CO2 30 (H) 23 - 29 mmol/L    Glucose 89 70 - 110 mg/dL    BUN, Bld 17 8 - 23 mg/dL    Creatinine 0.9 0.5 - 1.4 mg/dL    Calcium 9.3 8.7 - 10.5 mg/dL    Total Protein 6.2 6.0 - 8.4 g/dL    Albumin 3.9 3.5 - 5.2 g/dL    Total Bilirubin 1.5 (H) 0.1 - 1.0 mg/dL    Alkaline Phosphatase 69 55 - 135 U/L    AST 28 10 - 40 U/L    ALT 19 10 - 44 U/L    Anion Gap 8 8 - 16 mmol/L    eGFR if African American >60.0 >60 mL/min/1.73 m^2    eGFR if non African American >60.0 >60 mL/min/1.73 m^2       Assessment:       1. Preventative health care    2. Hypothyroidism due to acquired atrophy of thyroid    3. Breast cancer screening    4. Menopause    5. Encounter for screening mammogram for malignant neoplasm of breast         Plan:       Preventative health care    Hypothyroidism due to acquired atrophy of thyroid  -     TSH; Future; Expected date: 01/10/2020    Breast cancer screening  -     Mammo Digital Screening Bilat; Future; Expected date: 01/10/2020    Menopause  -     DXA Bone Density Spine And Hip; Future; Expected date: 01/10/2020    Encounter for screening mammogram for malignant neoplasm of breast   -     Mammo Digital Screening Bilat; Future; Expected date: 01/10/2020              Counseled on regular exercise, maintenance of a healthy weight, balanced diet rich in fruits/vegetables and lean protein, and avoidance of unhealthy habits like smoking and excessive alcohol intake.  Continue present meds and speciality follow-up  F/u with labs in 12 months

## 2020-01-11 LAB
T4 FREE SERPL-MCNC: 1.26 NG/DL (ref 0.71–1.51)
TSH SERPL DL<=0.005 MIU/L-ACNC: 0.28 UIU/ML (ref 0.4–4)

## 2020-02-03 RX ORDER — TRAMADOL HYDROCHLORIDE 50 MG/1
50 TABLET ORAL EVERY 6 HOURS PRN
Qty: 30 TABLET | Refills: 0 | Status: SHIPPED | OUTPATIENT
Start: 2020-02-03 | End: 2020-07-22 | Stop reason: SDUPTHER

## 2020-02-03 NOTE — PROGRESS NOTES
Refill Routing Note     Medication(s) are appropriate for refill:    Medication Outside of Protocol    Appointments  past 15m or future 3m with PCP    Date Provider   Last Visit   1/10/2020 Hany Adan MD   Next Visit   Visit date not found Hany Adan MD       Automatic Epic Protocol Generated Data:    Requested Prescriptions   Pending Prescriptions Disp Refills    traMADol (ULTRAM) 50 mg tablet 30 tablet 0     Sig: Take 1 tablet (50 mg total) by mouth every 6 (six) hours as needed for Pain.       Narcotics Refill Protocol Passed - 2/3/2020 10:30 AM        Passed - Patient not pregnant        Passed - Patient seen within 3 months     Last visit with Hany Adan MD: 1/10/2020  Last visit in [facility]: 1/10/2020    Patient's next visit in [facility]: Visit date not found           Passed - Med not refilled within 4 weeks           Note created:02/03/2020

## 2020-02-04 ENCOUNTER — HOSPITAL ENCOUNTER (OUTPATIENT)
Dept: RADIOLOGY | Facility: HOSPITAL | Age: 80
Discharge: HOME | End: 2020-02-04
Attending: FAMILY MEDICINE
Payer: MEDICARE

## 2020-02-04 DIAGNOSIS — Z12.31 ENCOUNTER FOR SCREENING MAMMOGRAM FOR MALIGNANT NEOPLASM OF BREAST: ICD-10-CM

## 2020-02-04 DIAGNOSIS — Z12.39 BREAST CANCER SCREENING: ICD-10-CM

## 2020-02-04 DIAGNOSIS — Z78.0 MENOPAUSE: ICD-10-CM

## 2020-02-04 PROCEDURE — 77067 SCR MAMMO BI INCL CAD: CPT | Mod: 26,,, | Performed by: RADIOLOGY

## 2020-02-04 PROCEDURE — 77080 DXA BONE DENSITY AXIAL: CPT | Mod: TC,PO

## 2020-02-04 PROCEDURE — 77067 MAMMO DIGITAL SCREENING BILAT WITH TOMOSYNTHESIS_CAD: ICD-10-PCS | Mod: 26,,, | Performed by: RADIOLOGY

## 2020-02-04 PROCEDURE — 77063 BREAST TOMOSYNTHESIS BI: CPT | Mod: 26,,, | Performed by: RADIOLOGY

## 2020-02-04 PROCEDURE — 77063 MAMMO DIGITAL SCREENING BILAT WITH TOMOSYNTHESIS_CAD: ICD-10-PCS | Mod: 26,,, | Performed by: RADIOLOGY

## 2020-02-04 PROCEDURE — 77080 DEXA BONE DENSITY SPINE HIP: ICD-10-PCS | Mod: 26,,, | Performed by: RADIOLOGY

## 2020-02-04 PROCEDURE — 77067 SCR MAMMO BI INCL CAD: CPT | Mod: TC,PO

## 2020-02-04 PROCEDURE — 77080 DXA BONE DENSITY AXIAL: CPT | Mod: 26,,, | Performed by: RADIOLOGY

## 2020-02-05 ENCOUNTER — TELEPHONE (OUTPATIENT)
Dept: FAMILY MEDICINE | Facility: CLINIC | Age: 80
End: 2020-02-05

## 2020-02-05 NOTE — TELEPHONE ENCOUNTER
Spoke to pt regarding the message she left. Pt states that [facility]. Nothing noted in chart. Pt verbalized understanding.

## 2020-02-05 NOTE — TELEPHONE ENCOUNTER
----- Message from Danny Hong sent at 2/5/2020  3:48 PM CST -----  Contact: Patient  Type:  Patient Returning Call    Who Called:  Patient  Who Left Message for Patient:  unknown  Does the patient know what this is regarding?:  Unknown, no note/message  Best Call Back Number:  [phone]  Additional Information:  NA

## 2020-02-18 ENCOUNTER — CLINICAL SUPPORT (OUTPATIENT)
Dept: CARDIOLOGY | Facility: CLINIC | Age: 80
End: 2020-02-18
Attending: INTERNAL MEDICINE
Payer: MEDICARE

## 2020-02-18 DIAGNOSIS — Z95.0 CARDIAC PACEMAKER IN SITU: ICD-10-CM

## 2020-02-18 DIAGNOSIS — R00.1 BRADYCARDIA: ICD-10-CM

## 2020-02-19 ENCOUNTER — DOCUMENTATION ONLY (OUTPATIENT)
Dept: CARDIOLOGY | Facility: CLINIC | Age: 80
End: 2020-02-19

## 2020-02-19 ENCOUNTER — TELEPHONE (OUTPATIENT)
Dept: CARDIOLOGY | Facility: CLINIC | Age: 80
End: 2020-02-19

## 2020-02-19 NOTE — TELEPHONE ENCOUNTER
Late entry from 2/18  Pt. Seen in device clinic for PPM interrogation. Interrogation done by Rosi with [employer]. Pt. Reported 5/10 chest pain that pt. Describes as pressure that radiated to left jaw. Pt. States she has been having intermittent chest pain. Pt. Denied shortness of breath. Vitals BP-110/67, P- 60 AP/VS SPO2-98 % on room air. Pain last approximately for 2 mins. Reviewed with Dr. Patterson per Dr. Patterson pt. To be scheduled for a stress test. Reviewed with pt. Plan of care. Pt. Verbalized understanding. Pt. Reports being completely pain free prior to leaving. Pt. Walked to nursing station to schedule stress test.

## 2020-02-24 ENCOUNTER — PATIENT MESSAGE (OUTPATIENT)
Dept: DERMATOLOGY | Facility: CLINIC | Age: 80
End: 2020-02-24

## 2020-03-02 ENCOUNTER — PATIENT OUTREACH (OUTPATIENT)
Dept: ADMINISTRATIVE | Facility: OTHER | Age: 80
End: 2020-03-02

## 2020-03-03 ENCOUNTER — OFFICE VISIT (OUTPATIENT)
Dept: DERMATOLOGY | Facility: CLINIC | Age: 80
End: 2020-03-03
Payer: MEDICARE

## 2020-03-03 VITALS — WEIGHT: 143.31 LBS | BODY MASS INDEX: 24.46 KG/M2 | HEIGHT: 64 IN | RESPIRATION RATE: 18 BRPM

## 2020-03-03 DIAGNOSIS — D48.5 NEOPLASM OF UNCERTAIN BEHAVIOR OF SKIN: Primary | ICD-10-CM

## 2020-03-03 PROCEDURE — 11102 TANGNTL BX SKIN SINGLE LES: CPT | Mod: S$GLB,,, | Performed by: DERMATOLOGY

## 2020-03-03 PROCEDURE — 88305 TISSUE EXAM BY PATHOLOGIST: CPT | Mod: 26,,, | Performed by: PATHOLOGY

## 2020-03-03 PROCEDURE — 88305 TISSUE EXAM BY PATHOLOGIST: CPT | Performed by: PATHOLOGY

## 2020-03-03 PROCEDURE — 88305 TISSUE EXAM BY PATHOLOGIST: ICD-10-PCS | Mod: 26,,, | Performed by: PATHOLOGY

## 2020-03-03 PROCEDURE — 11102 PR TANGENTIAL BIOPSY, SKIN, SINGLE LESION: ICD-10-PCS | Mod: S$GLB,,, | Performed by: DERMATOLOGY

## 2020-03-03 PROCEDURE — 99499 NO LOS: ICD-10-PCS | Mod: S$GLB,,, | Performed by: DERMATOLOGY

## 2020-03-03 PROCEDURE — 99999 PR PBB SHADOW E&M-EST. PATIENT-LVL III: CPT | Mod: PBBFAC,,, | Performed by: DERMATOLOGY

## 2020-03-03 PROCEDURE — 99999 PR PBB SHADOW E&M-EST. PATIENT-LVL III: ICD-10-PCS | Mod: PBBFAC,,, | Performed by: DERMATOLOGY

## 2020-03-03 PROCEDURE — 99499 UNLISTED E&M SERVICE: CPT | Mod: S$GLB,,, | Performed by: DERMATOLOGY

## 2020-03-03 NOTE — PROGRESS NOTES
Subjective:       Patient ID:  Jackie Landa is a 79 y.o. female who presents for   Chief Complaint   Patient presents with    Lesion     lower back      80 y/o F presents for evaluation of a lesion on her R lower back years. Bleeding x 2-3 months.  Last seen Jan 2019. Treated w/ OTC antibiotic with no improvement     Derm Hx:   Dr Mejia Bx x 2 of lip and ear-->pre-cancers treated with LN (March 2018)  Chondrodermatitis nodularis helicis of left       Review of Systems   Constitutional: Negative for fever and chills.   Skin: Positive for dry skin and wears hat. Negative for itching, rash, daily sunscreen use and activity-related sunscreen use.   Hematologic/Lymphatic: Bruises/bleeds easily.      Past Medical History:   Diagnosis Date    A-fib     ARDS (adult respiratory distress syndrome) 2000    on ventilator for 14 days    Asthmatic bronchitis with acute exacerbation 2/25/2017    Bladder cancer     s/p BCG, interferon; cancer free since 1999, had twice, 1994 first    Cancer of overlapping sites of bladder 10/10/2017    Cardiac pacemaker in situ 9/27/2013    10/13 [employer]      Coronary artery disease     9/2016-Mild, moderate stenosis. Pre and post-stent of the LAD verified by FFR.    Coronary artery disease involving native coronary artery of native heart with angina pectoris with documented spasm 2/22/2013 9/2016 [facility] Mild, moderate stenosis. Pre and post-stent of the LAD verified by FFR. Normal diastolic function and normal systolic function.    DDD (degenerative disc disease), cervical     Depression     Difficult intubation 10/2017    Had trauma and bruising to her mouth and tongue last intubation at [facility]    GERD (gastroesophageal reflux disease)     Hiatal hernia     HLD (hyperlipidemia)     Hypothyroidism     Intermittent self-catheterization of bladder     MVP (mitral valve prolapse)     Neurogenic bladder     Pacemaker     PAF (paroxysmal atrial  fibrillation) 2/22/2013    On sotalol    S/P coronary artery stent placement - LAD 2006; Patent 09/2012 2/22/2013 2012 angio Patent LAD stent with 50-60 post stent dsx FFR 0.88     Urge incontinence of urine        Objective:    Physical Exam   Constitutional: She appears well-developed and well-nourished.   Neurological: She is alert and oriented to person, place, and time.   Psychiatric: She has a normal mood and affect.   Skin:   Areas Examined (abnormalities noted in diagram):   Head / Face Inspection Performed  Back Inspection Performed                  Diagram Legend     Erythematous scaling macule/papule c/w actinic keratosis       Vascular papule c/w angioma      Pigmented verrucoid papule/plaque c/w seborrheic keratosis      Yellow umbilicated papule c/w sebaceous hyperplasia      Irregularly shaped tan macule c/w lentigo     1-2 mm smooth white papules consistent with Milia      Movable subcutaneous cyst with punctum c/w epidermal inclusion cyst      Subcutaneous movable cyst c/w pilar cyst      Firm pink to brown papule c/w dermatofibroma      Pedunculated fleshy papule(s) c/w skin tag(s)      Evenly pigmented macule c/w junctional nevus     Mildly variegated pigmented, slightly irregular-bordered macule c/w mildly atypical nevus      Flesh colored to evenly pigmented papule c/w intradermal nevus       Pink pearly papule/plaque c/w basal cell carcinoma      Erythematous hyperkeratotic cursted plaque c/w SCC      Surgical scar with no sign of skin cancer recurrence      Open and closed comedones      Inflammatory papules and pustules      Verrucoid papule consistent consistent with wart     Erythematous eczematous patches and plaques     Dystrophic onycholytic nail with subungual debris c/w onychomycosis     Umbilicated papule    Erythematous-base heme-crusted tan verrucoid plaque consistent with inflamed seborrheic keratosis     Erythematous Silvery Scaling Plaque c/w Psoriasis     See  annotation      Assessment / Plan:      Pathology Orders:     Normal Orders This Visit    Specimen to Pathology, Dermatology     Questions:    Procedure Type:  Dermatology and skin neoplasms    Number of Specimens:  1    ------------------------:  -------------------------    Spec 1 Procedure:  Biopsy    Spec 1 Clinical Impression:  traumatized nevus vs SCC vs other    Spec 1 Source:  R lower back        Neoplasm of uncertain behavior of skin  -     Specimen to Pathology, Dermatology    Shave biopsy procedure note:    Shave biopsy performed after verbal consent including risk of infection, scar, recurrence, need for additional treatment of site. Area prepped with alcohol, anesthetized with approximately 1.0cc of 1% lidocaine with epinephrine. Lesional tissue shaved with razor blade. Hemostasis achieved with application of aluminum chloride followed by hyfrecation. No complications. Dressing applied. Wound care explained.             Follow up for pending Pathology.

## 2020-03-05 LAB
FINAL PATHOLOGIC DIAGNOSIS: NORMAL
GROSS: NORMAL

## 2020-03-06 ENCOUNTER — TELEPHONE (OUTPATIENT)
Dept: FAMILY MEDICINE | Facility: CLINIC | Age: 80
End: 2020-03-06

## 2020-03-06 NOTE — TELEPHONE ENCOUNTER
----- Message from Austyn Lr sent at 3/6/2020  4:01 PM CST -----  Contact: [facility]  Type:  Patient Returning Call    Who Called:  pt  Does the patient know what this is regarding?:  Needs medical necessity form, clinical notes and orders.  Best Call Back Number:   Ext [phone]  Additional Information:  Please give [facility] a call back as soon as possible.

## 2020-03-09 ENCOUNTER — PATIENT MESSAGE (OUTPATIENT)
Dept: FAMILY MEDICINE | Facility: CLINIC | Age: 80
End: 2020-03-09

## 2020-03-09 ENCOUNTER — TELEPHONE (OUTPATIENT)
Dept: DERMATOLOGY | Facility: CLINIC | Age: 80
End: 2020-03-09

## 2020-03-09 NOTE — TELEPHONE ENCOUNTER
Spoke w/ pt. Informed pt about results and recommendations per provider. pt verbalized understanding.    appt on 3/26/2020.

## 2020-03-09 NOTE — TELEPHONE ENCOUNTER
----- Message from Claudine Vizcarra MD sent at 3/6/2020  2:01 PM CST -----  Skin, right lower back, shave biopsy:  - BASAL CELL CARCINOMA WITH NODULAR GROWTH PATTERN.  - THE TUMOR EXTENDS TO THE DEEP AND LATERAL BIOPSY MARGINS    Please schedule for 30 min appt for excision  Thanks  DEEDEE

## 2020-03-10 ENCOUNTER — TELEPHONE (OUTPATIENT)
Dept: FAMILY MEDICINE | Facility: CLINIC | Age: 80
End: 2020-03-10

## 2020-03-10 NOTE — TELEPHONE ENCOUNTER
----- Message from Aubrie Chowdhury sent at 3/10/2020  8:23 AM CDT -----  Contact: Carlos PUTNAM  Type:  Patient Returning Call    Who Called:  Carlos  Who Left Message for Patient:  Joy  Does the patient know what this is regarding?:  Diabetic supplies  Best Call Back Number:  [phone]  Additional Information:  Pls call Carlos oro

## 2020-03-10 NOTE — TELEPHONE ENCOUNTER
----- Message from Aubrie Chowdhury sent at 3/10/2020  8:23 AM CDT -----  Contact: Carlos PUTNAM  Type:  Patient Returning Call    Who Called:  Carlos  Who Left Message for Patient:  Joy  Does the patient know what this is regarding?:  Diabetic supplies  Best Call Back Number:  [phone]  Additional Information:  Pls call Carlos oro

## 2020-03-11 NOTE — TELEPHONE ENCOUNTER
[facility] with [employer] to inquire about the receipt of an order for catheter supplies. Melisa verified receiving the order, authorization number [id number], effective 3/11/2020-6/11/2020 for 600units each for a4332 and a4351.

## 2020-03-16 ENCOUNTER — PATIENT MESSAGE (OUTPATIENT)
Dept: CARDIOLOGY | Facility: CLINIC | Age: 80
End: 2020-03-16

## 2020-03-17 ENCOUNTER — CLINICAL SUPPORT (OUTPATIENT)
Dept: CARDIOLOGY | Facility: CLINIC | Age: 80
End: 2020-03-17
Attending: INTERNAL MEDICINE
Payer: MEDICARE

## 2020-03-17 DIAGNOSIS — R00.1 BRADYCARDIA: ICD-10-CM

## 2020-03-17 DIAGNOSIS — Z95.0 CARDIAC PACEMAKER IN SITU: ICD-10-CM

## 2020-05-05 ENCOUNTER — PATIENT MESSAGE (OUTPATIENT)
Dept: ADMINISTRATIVE | Facility: HOSPITAL | Age: 80
End: 2020-05-05

## 2020-05-07 DIAGNOSIS — E03.4 HYPOTHYROIDISM DUE TO ACQUIRED ATROPHY OF THYROID: ICD-10-CM

## 2020-05-07 RX ORDER — LEVOTHYROXINE SODIUM 88 UG/1
88 TABLET ORAL DAILY
Qty: 90 TABLET | Refills: 3 | Status: SHIPPED | OUTPATIENT
Start: 2020-05-07 | End: 2021-05-04 | Stop reason: SDUPTHER

## 2020-05-31 ENCOUNTER — CLINICAL SUPPORT (OUTPATIENT)
Dept: CARDIOLOGY | Facility: CLINIC | Age: 80
End: 2020-05-31
Payer: MEDICARE

## 2020-05-31 PROCEDURE — 93294 REM INTERROG EVL PM/LDLS PM: CPT | Mod: ,,, | Performed by: INTERNAL MEDICINE

## 2020-05-31 PROCEDURE — 93294 CARDIAC DEVICE CHECK - REMOTE: ICD-10-PCS | Mod: ,,, | Performed by: INTERNAL MEDICINE

## 2020-05-31 PROCEDURE — 93296 REM INTERROG EVL PM/IDS: CPT | Mod: PBBFAC,PO | Performed by: INTERNAL MEDICINE

## 2020-06-11 ENCOUNTER — PROCEDURE VISIT (OUTPATIENT)
Dept: DERMATOLOGY | Facility: CLINIC | Age: 80
End: 2020-06-11
Payer: MEDICARE

## 2020-06-11 VITALS — RESPIRATION RATE: 18 BRPM | BODY MASS INDEX: 24.59 KG/M2 | HEIGHT: 64 IN | WEIGHT: 144 LBS

## 2020-06-11 DIAGNOSIS — C44.519 BASAL CELL CARCINOMA (BCC) OF BACK: ICD-10-CM

## 2020-06-11 DIAGNOSIS — D48.5 NEOPLASM OF UNCERTAIN BEHAVIOR OF SKIN: Primary | ICD-10-CM

## 2020-06-11 PROCEDURE — 88305 TISSUE EXAM BY PATHOLOGIST: CPT | Mod: 59 | Performed by: PATHOLOGY

## 2020-06-11 PROCEDURE — 11103 PR TANGENTIAL BIOPSY, SKIN, EA ADDTL LESION: ICD-10-PCS | Mod: 59,S$GLB,, | Performed by: DERMATOLOGY

## 2020-06-11 PROCEDURE — 11103 TANGNTL BX SKIN EA SEP/ADDL: CPT | Mod: 59,S$GLB,, | Performed by: DERMATOLOGY

## 2020-06-11 PROCEDURE — 11102 TANGNTL BX SKIN SINGLE LES: CPT | Mod: S$GLB,,, | Performed by: DERMATOLOGY

## 2020-06-11 PROCEDURE — 99499 UNLISTED E&M SERVICE: CPT | Mod: S$GLB,,, | Performed by: DERMATOLOGY

## 2020-06-11 PROCEDURE — 99499 NO LOS: ICD-10-PCS | Mod: S$GLB,,, | Performed by: DERMATOLOGY

## 2020-06-11 PROCEDURE — 88305 TISSUE EXAM BY PATHOLOGIST: CPT | Mod: 26,,, | Performed by: PATHOLOGY

## 2020-06-11 PROCEDURE — 11102 PR TANGENTIAL BIOPSY, SKIN, SINGLE LESION: ICD-10-PCS | Mod: S$GLB,,, | Performed by: DERMATOLOGY

## 2020-06-11 PROCEDURE — 88305 TISSUE EXAM BY PATHOLOGIST: ICD-10-PCS | Mod: 26,,, | Performed by: PATHOLOGY

## 2020-06-11 NOTE — PROGRESS NOTES
Subjective:       Patient ID:  Jackie Landa is a 79 y.o. female who presents for   Chief Complaint   Patient presents with    Follow-up     excision BCC right lower back     Lesion     X's 2 - back      78 y/o F presents for follow up. Last OV 3-3-20   Presents today for evaluation of BCC on R lower back.  She thinks it is totally healed.   Pt states she has 2 new lesions on back       Derm Hx:   Dr Mejia Bx x 2 of lip and ear-->pre-cancers treated with LN (March 2018)  Chondrodermatitis nodularis helicis of left   3-2020 - BCC Right lower back         Review of Systems   Constitutional: Negative for fever and chills.   Skin: Positive for dry skin and wears hat. Negative for itching, rash, daily sunscreen use and activity-related sunscreen use.   Hematologic/Lymphatic: Bruises/bleeds easily.      Past Medical History:   Diagnosis Date    A-fib     ARDS (adult respiratory distress syndrome) 2000    on ventilator for 14 days    Asthmatic bronchitis with acute exacerbation 2/25/2017    Basal cell carcinoma 03/2020    Right lower back     Bladder cancer     s/p BCG, interferon; cancer free since 1999, had twice, 1994 first    Cancer of overlapping sites of bladder 10/10/2017    Cardiac pacemaker in situ 9/27/2013    10/13 [employer]      Coronary artery disease     9/2016-Mild, moderate stenosis. Pre and post-stent of the LAD verified by FFR.    Coronary artery disease involving native coronary artery of native heart with angina pectoris with documented spasm 2/22/2013 9/2016 [facility] Mild, moderate stenosis. Pre and post-stent of the LAD verified by FFR. Normal diastolic function and normal systolic function.    DDD (degenerative disc disease), cervical     Depression     Difficult intubation 10/2017    Had trauma and bruising to her mouth and tongue last intubation at [facility]    GERD (gastroesophageal reflux disease)     Hiatal hernia     HLD (hyperlipidemia)     Hypothyroidism      Intermittent self-catheterization of bladder     MVP (mitral valve prolapse)     Neurogenic bladder     Pacemaker     PAF (paroxysmal atrial fibrillation) 2/22/2013    On sotalol    S/P coronary artery stent placement - LAD 2006; Patent 09/2012 2/22/2013 2012 angio Patent LAD stent with 50-60 post stent dsx FFR 0.88     Urge incontinence of urine        Objective:    Physical Exam   Skin:   Areas Examined (abnormalities noted in diagram):   Head / Face Inspection Performed  Back Inspection Performed              Diagram Legend       Flesh colored to evenly pigmented papule c/w intradermal nevus       See annotation    Skin, right lower back- PATHOLOGY DATED 3-2020   - BASAL CELL CARCINOMA WITH NODULAR GROWTH PATTERN.  - THE TUMOR EXTENDS TO THE DEEP AND LATERAL BIOPSY MARGINS.  MICROSCOPIC DESCRIPTION: Sections shows a nodular tumor composed of basaloid cells exhibiting  peripheral palisading, retraction artifact and apoptosis.    Assessment / Plan:      Pathology Orders:     Normal Orders This Visit    Specimen to Pathology, Dermatology     Questions:    Procedure Type:  Dermatology and skin neoplasms    Number of Specimens:  2    ------------------------:  -------------------------    Spec 1 Procedure:  Biopsy    Spec 1 Clinical Impression:  inflamed nevus vs tag    Spec 1 Source:  superior back    ------------------------:  -------------------------    Spec 2 Procedure:  Biopsy    Spec 2 Clinical Impression:  inflamed nevus vs tag    Spec 2 Source:  inferior back        Neoplasm of uncertain behavior of skin  -     Specimen to Pathology, Dermatology  Shave biopsy procedure note:    Shave biopsy performed after verbal consent including risk of infection, scar, recurrence, need for additional treatment of site. Area prepped with alcohol, anesthetized with approximately 1.0cc of 1% lidocaine with epinephrine. Lesional tissue shaved with razor blade. Hemostasis achieved with application of aluminum  chloride followed by hyfrecation. No complications. Dressing applied. Wound care explained.    Shave biopsy procedure note:    Shave biopsy performed after verbal consent including risk of infection, scar, recurrence, need for additional treatment of site. Area prepped with alcohol, anesthetized with approximately 1.0cc of 1% lidocaine with epinephrine. Lesional tissue shaved with razor blade. Hemostasis achieved with application of aluminum chloride followed by hyfrecation. No complications. Dressing applied. Wound care explained.      Basal cell carcinoma (BCC) of back  Well healed.  Will continue to monitor for recurrence           Follow up in about 3 months (around 9/11/2020).

## 2020-06-16 LAB
FINAL PATHOLOGIC DIAGNOSIS: NORMAL
GROSS: NORMAL
MICROSCOPIC EXAM: NORMAL

## 2020-06-17 ENCOUNTER — TELEPHONE (OUTPATIENT)
Dept: DERMATOLOGY | Facility: CLINIC | Age: 80
End: 2020-06-17

## 2020-06-17 NOTE — TELEPHONE ENCOUNTER
Pt contacted with results.       ----- Message from Claudine Vizcarra MD sent at 6/16/2020 12:03 PM CDT -----  Both lesions were benign moles.  No further action necessary  Thanks  DEEDEE

## 2020-07-13 ENCOUNTER — LAB VISIT (OUTPATIENT)
Dept: FAMILY MEDICINE | Facility: CLINIC | Age: 80
End: 2020-07-13
Payer: MEDICARE

## 2020-07-13 DIAGNOSIS — Z03.818 ENCOUNTER FOR OBSERVATION FOR SUSPECTED EXPOSURE TO OTHER BIOLOGICAL AGENTS RULED OUT: ICD-10-CM

## 2020-07-13 PROCEDURE — U0003 INFECTIOUS AGENT DETECTION BY NUCLEIC ACID (DNA OR RNA); SEVERE ACUTE RESPIRATORY SYNDROME CORONAVIRUS 2 (SARS-COV-2) (CORONAVIRUS DISEASE [COVID-19]), AMPLIFIED PROBE TECHNIQUE, MAKING USE OF HIGH THROUGHPUT TECHNOLOGIES AS DESCRIBED BY CMS-2020-01-R: HCPCS

## 2020-07-14 LAB — SARS-COV-2 RNA RESP QL NAA+PROBE: NOT DETECTED

## 2020-07-16 PROBLEM — N31.9 NEUROGENIC DYSFUNCTION OF THE URINARY BLADDER: Status: ACTIVE | Noted: 2020-07-16

## 2020-07-20 RX ORDER — OXYBUTYNIN CHLORIDE 15 MG/1
15 TABLET, EXTENDED RELEASE ORAL DAILY
Qty: 90 TABLET | Refills: 3 | Status: SHIPPED | OUTPATIENT
Start: 2020-07-20 | End: 2021-05-04 | Stop reason: SDUPTHER

## 2020-07-20 NOTE — PROGRESS NOTES
Refill Routing Note   Medication(s) are not appropriate for processing by [facility]:       - Outside of protocol           Medication reconciliation completed: No      Automatic Epic Generated Protocol Data:    Requested Prescriptions   Pending Prescriptions Disp Refills    oxybutynin (DITROPAN XL) 15 MG TR24 90 tablet 3     Sig: Take 1 tablet (15 mg total) by mouth once daily.       There is no refill protocol information for this order           Appointments  past 12m or future 3m with PCP    Date Provider   Last Visit   1/10/2020 Hany Adan MD   Next Visit   Visit date not found Hany Adan MD   ED visits in past 90 days: 0     Note composed:6:46 AM 07/20/2020

## 2020-07-22 RX ORDER — TRAMADOL HYDROCHLORIDE 50 MG/1
50 TABLET ORAL EVERY 6 HOURS PRN
Qty: 30 TABLET | Refills: 0 | Status: SHIPPED | OUTPATIENT
Start: 2020-07-22 | End: 2021-12-23 | Stop reason: SDUPTHER

## 2020-07-24 ENCOUNTER — TELEPHONE (OUTPATIENT)
Dept: FAMILY MEDICINE | Facility: CLINIC | Age: 80
End: 2020-07-24

## 2020-07-24 NOTE — TELEPHONE ENCOUNTER
----- Message from Julissa Spicer sent at 7/23/2020 12:10 PM CDT -----  Contact: pt  Type: Needs Medical Advice    Who Called:  Pt  Best Call Back Number: [phone]  Additional Information: Requesting a call back regarding pt cath script   Please Advise ---Thank you

## 2020-07-24 NOTE — TELEPHONE ENCOUNTER
----- Message from Karlene Gonsalez sent at 7/24/2020  1:26 PM CDT -----  Type:  Patient Returning Call    Who Called:  Patient  Who Left Message for Patient:  Iman  Does the patient know what this is regarding?:   yes  Best Call Back Number:  [phone]  Additional Information:  No response on messenger.

## 2020-07-27 ENCOUNTER — PATIENT MESSAGE (OUTPATIENT)
Dept: FAMILY MEDICINE | Facility: CLINIC | Age: 80
End: 2020-07-27

## 2020-07-28 ENCOUNTER — TELEPHONE (OUTPATIENT)
Dept: FAMILY MEDICINE | Facility: CLINIC | Age: 80
End: 2020-07-28

## 2020-07-28 NOTE — TELEPHONE ENCOUNTER
----- Message from Radha Holland sent at 7/27/2020  1:25 PM CDT -----  Regarding: Refill catheters  Contact: patient  Type: Needs Medical Advice  Who Called:  Patient  Best Call Back Number: [phone]  Additional Information: patient states she needs to speak with nurse about getting in touch with [facility] about her catheters.  Please call to advise. Thanks!

## 2020-07-28 NOTE — TELEPHONE ENCOUNTER
----- Message from Danny Hong sent at 7/27/2020  1:41 PM CDT -----  Type: Needs Medical Advice    Who Called:  Deepak ([facility])  Best Call Back Number: [phone]  Additional Information: Caller would like to discuss receiving medical necessity form and clinical notes. Caller's fax number is [phone]. Please call to advise. Thanks!

## 2020-07-28 NOTE — TELEPHONE ENCOUNTER
Please see rx pended for Cathether use per pt request  Needs to be sent to [facility]

## 2020-07-28 NOTE — TELEPHONE ENCOUNTER
Spoke to Deepak with [employer] [facility] to confirm paperwork needed. Deepak stated the order, clinical notes, and medical necessity form. Call ended.    Already handled in another encounter.

## 2020-08-17 DIAGNOSIS — I48.0 PAF (PAROXYSMAL ATRIAL FIBRILLATION): Primary | Chronic | ICD-10-CM

## 2020-08-18 RX ORDER — SOTALOL HYDROCHLORIDE 80 MG/1
TABLET ORAL
Qty: 90 TABLET | Refills: 2 | OUTPATIENT
Start: 2020-08-18

## 2020-08-27 RX ORDER — SIMVASTATIN 40 MG/1
40 TABLET, FILM COATED ORAL NIGHTLY
Qty: 90 TABLET | Refills: 4 | Status: SHIPPED | OUTPATIENT
Start: 2020-08-27 | End: 2021-08-26

## 2020-08-27 RX ORDER — SIMVASTATIN 40 MG/1
40 TABLET, FILM COATED ORAL NIGHTLY
Qty: 90 TABLET | Refills: 4 | Status: CANCELLED | OUTPATIENT
Start: 2020-08-27

## 2020-08-29 ENCOUNTER — CLINICAL SUPPORT (OUTPATIENT)
Dept: CARDIOLOGY | Facility: CLINIC | Age: 80
End: 2020-08-29
Payer: MEDICARE

## 2020-08-29 DIAGNOSIS — Z95.0 PRESENCE OF CARDIAC PACEMAKER: ICD-10-CM

## 2020-08-29 PROCEDURE — 93296 REM INTERROG EVL PM/IDS: CPT | Mod: PBBFAC,PO | Performed by: INTERNAL MEDICINE

## 2020-08-29 PROCEDURE — 93294 REM INTERROG EVL PM/LDLS PM: CPT | Mod: ,,, | Performed by: INTERNAL MEDICINE

## 2020-08-29 PROCEDURE — 93294 CARDIAC DEVICE CHECK - REMOTE: ICD-10-PCS | Mod: ,,, | Performed by: INTERNAL MEDICINE

## 2020-09-07 ENCOUNTER — PATIENT MESSAGE (OUTPATIENT)
Dept: FAMILY MEDICINE | Facility: CLINIC | Age: 80
End: 2020-09-07

## 2020-09-08 NOTE — TELEPHONE ENCOUNTER
No new care gaps identified.  Powered by [employer]. Reference number: [id number]. 9/08/2020 7:43:56 AM TREASURET

## 2020-09-10 RX ORDER — FLUTICASONE FUROATE AND VILANTEROL TRIFENATATE 100; 25 UG/1; UG/1
1 POWDER RESPIRATORY (INHALATION) DAILY
Qty: 180 EACH | Refills: 1 | Status: SHIPPED | OUTPATIENT
Start: 2020-09-10 | End: 2021-03-09

## 2020-09-10 NOTE — PROGRESS NOTES
Refill Routing Note   Medication(s) are not appropriate for processing by [facility]:       - Medication not previously prescribed by PCP        Medication Therapy Plan: CDMr; Breo active on med list as historical med; Not previously prescirbed by you; Defer to you  Medication reconciliation completed: No      Automatic Epic Generated Protocol Data:        Requested Prescriptions   Pending Prescriptions Disp Refills    BREO ELLIPTA 100-25 mcg/dose diskus inhaler 180 each 1     Sig: Inhale 1 puff into the lungs once daily.       Pulmonology:  Combination Products Passed - 9/8/2020 12:25 PM        Passed - Patient is at least 18 years old        Passed - Patient has applicable pulmonary diagnosis on their problem list        Passed - There is a short-acting beta agonist medication on the active medication list        Passed - Last Heart Rate in normal range within 360 days.     Pulse Readings from Last 3 Encounters:   07/16/20 56   01/10/20 95   09/30/19 60             Passed - Office visit in past 12 months or future 90 days.     Recent Outpatient Visits            6 months ago Neoplasm of uncertain behavior of skin    [facility] Dermatology Claudine Vizcarra MD    8 months ago Preventative health care    [facility] Hany Adan MD    11 months ago S/P coronary artery stent placement - LAD 2006; Patent 09/2012    [facility] Cardiology Vincent Patterson MD    1 year ago Bronchitis    [facility] Medicine Barbara Fountain NP    1 year ago Contact dermatitis, unspecified contact dermatitis type, unspecified trigger    [facility] Dermatology Claudine Vizcarra MD          Future Appointments              In 2 weeks LAB, [place] [facility]    In 2 weeks [facility]    In 3 weeks Vincent Patterson [facility]    In 2 months HOME MONITOR DEVICE CHECK, [facility],  [place]                      Appointments  past 12m or future 3m with PCP    Date Provider   Last Visit   1/10/2020 Hany Adan MD   Next Visit   Visit date not found Hany Adan MD   ED visits in past 90 days: 0     Note composed:8:25 AM 09/10/2020

## 2020-09-15 RX ORDER — ALBUTEROL SULFATE 90 UG/1
AEROSOL, METERED RESPIRATORY (INHALATION)
Qty: 8 G | Refills: 4 | Status: SHIPPED | OUTPATIENT
Start: 2020-09-15

## 2020-09-28 ENCOUNTER — IMMUNIZATION (OUTPATIENT)
Dept: PHARMACY | Facility: CLINIC | Age: 80
End: 2020-09-28
Payer: MEDICARE

## 2020-09-28 ENCOUNTER — CLINICAL SUPPORT (OUTPATIENT)
Dept: CARDIOLOGY | Facility: CLINIC | Age: 80
End: 2020-09-28
Attending: INTERNAL MEDICINE
Payer: MEDICARE

## 2020-09-28 ENCOUNTER — LAB VISIT (OUTPATIENT)
Dept: LAB | Facility: HOSPITAL | Age: 80
End: 2020-09-28
Attending: INTERNAL MEDICINE
Payer: MEDICARE

## 2020-09-28 VITALS — WEIGHT: 137 LBS | BODY MASS INDEX: 23.39 KG/M2 | HEIGHT: 64 IN

## 2020-09-28 DIAGNOSIS — I48.0 PAF (PAROXYSMAL ATRIAL FIBRILLATION): Chronic | ICD-10-CM

## 2020-09-28 DIAGNOSIS — Z95.5 S/P CORONARY ARTERY STENT PLACEMENT: ICD-10-CM

## 2020-09-28 DIAGNOSIS — I25.111 CORONARY ARTERY DISEASE INVOLVING NATIVE CORONARY ARTERY OF NATIVE HEART WITH ANGINA PECTORIS WITH DOCUMENTED SPASM: ICD-10-CM

## 2020-09-28 DIAGNOSIS — I48.0 PAF (PAROXYSMAL ATRIAL FIBRILLATION): ICD-10-CM

## 2020-09-28 DIAGNOSIS — Z95.0 CARDIAC PACEMAKER IN SITU: ICD-10-CM

## 2020-09-28 DIAGNOSIS — Z95.0 CARDIAC PACEMAKER IN SITU: Chronic | ICD-10-CM

## 2020-09-28 LAB
ALBUMIN SERPL BCP-MCNC: 3.9 G/DL (ref 3.5–5.2)
ALP SERPL-CCNC: 66 U/L (ref 55–135)
ALT SERPL W/O P-5'-P-CCNC: 22 U/L (ref 10–44)
ANION GAP SERPL CALC-SCNC: 7 MMOL/L (ref 8–16)
AST SERPL-CCNC: 27 U/L (ref 10–40)
BASOPHILS # BLD AUTO: 0.03 K/UL (ref 0–0.2)
BASOPHILS NFR BLD: 0.5 % (ref 0–1.9)
BILIRUB SERPL-MCNC: 1.4 MG/DL (ref 0.1–1)
BUN SERPL-MCNC: 17 MG/DL (ref 8–23)
CALCIUM SERPL-MCNC: 9 MG/DL (ref 8.7–10.5)
CHLORIDE SERPL-SCNC: 105 MMOL/L (ref 95–110)
CHOLEST SERPL-MCNC: 150 MG/DL (ref 120–199)
CHOLEST/HDLC SERPL: 2.4 {RATIO} (ref 2–5)
CO2 SERPL-SCNC: 31 MMOL/L (ref 23–29)
CREAT SERPL-MCNC: 1 MG/DL (ref 0.5–1.4)
DIFFERENTIAL METHOD: ABNORMAL
EOSINOPHIL # BLD AUTO: 0.1 K/UL (ref 0–0.5)
EOSINOPHIL NFR BLD: 1.3 % (ref 0–8)
ERYTHROCYTE [DISTWIDTH] IN BLOOD BY AUTOMATED COUNT: 13.1 % (ref 11.5–14.5)
EST. GFR  (AFRICAN AMERICAN): >60 ML/MIN/1.73 M^2
EST. GFR  (NON AFRICAN AMERICAN): 53 ML/MIN/1.73 M^2
GLUCOSE SERPL-MCNC: 93 MG/DL (ref 70–110)
HCT VFR BLD AUTO: 42.2 % (ref 37–48.5)
HDLC SERPL-MCNC: 62 MG/DL (ref 40–75)
HDLC SERPL: 41.3 % (ref 20–50)
HGB BLD-MCNC: 12.9 G/DL (ref 12–16)
IMM GRANULOCYTES # BLD AUTO: 0.04 K/UL (ref 0–0.04)
IMM GRANULOCYTES NFR BLD AUTO: 0.7 % (ref 0–0.5)
LDLC SERPL CALC-MCNC: 70.2 MG/DL (ref 63–159)
LYMPHOCYTES # BLD AUTO: 1.5 K/UL (ref 1–4.8)
LYMPHOCYTES NFR BLD: 25 % (ref 18–48)
MCH RBC QN AUTO: 32.2 PG (ref 27–31)
MCHC RBC AUTO-ENTMCNC: 30.6 G/DL (ref 32–36)
MCV RBC AUTO: 105 FL (ref 82–98)
MONOCYTES # BLD AUTO: 0.6 K/UL (ref 0.3–1)
MONOCYTES NFR BLD: 9.5 % (ref 4–15)
NEUTROPHILS # BLD AUTO: 3.8 K/UL (ref 1.8–7.7)
NEUTROPHILS NFR BLD: 63 % (ref 38–73)
NONHDLC SERPL-MCNC: 88 MG/DL
NRBC BLD-RTO: 0 /100 WBC
PLATELET # BLD AUTO: 203 K/UL (ref 150–350)
PMV BLD AUTO: 10.3 FL (ref 9.2–12.9)
POTASSIUM SERPL-SCNC: 4.3 MMOL/L (ref 3.5–5.1)
PROT SERPL-MCNC: 6.2 G/DL (ref 6–8.4)
RBC # BLD AUTO: 4.01 M/UL (ref 4–5.4)
SODIUM SERPL-SCNC: 143 MMOL/L (ref 136–145)
TRIGL SERPL-MCNC: 89 MG/DL (ref 30–150)
WBC # BLD AUTO: 5.99 K/UL (ref 3.9–12.7)

## 2020-09-28 PROCEDURE — 93306 ECHO (CUPID ONLY): ICD-10-PCS | Mod: S$GLB,,, | Performed by: INTERNAL MEDICINE

## 2020-09-28 PROCEDURE — 80061 LIPID PANEL: CPT

## 2020-09-28 PROCEDURE — 80053 COMPREHEN METABOLIC PANEL: CPT

## 2020-09-28 PROCEDURE — 36415 COLL VENOUS BLD VENIPUNCTURE: CPT | Mod: PO

## 2020-09-28 PROCEDURE — 85025 COMPLETE CBC W/AUTO DIFF WBC: CPT

## 2020-09-28 PROCEDURE — 93306 TTE W/DOPPLER COMPLETE: CPT | Mod: S$GLB,,, | Performed by: INTERNAL MEDICINE

## 2020-09-28 PROCEDURE — 99999 PR PBB SHADOW E&M-EST. PATIENT-LVL I: ICD-10-PCS | Mod: PBBFAC,,,

## 2020-09-28 PROCEDURE — 99999 PR PBB SHADOW E&M-EST. PATIENT-LVL I: CPT | Mod: PBBFAC,,,

## 2020-09-29 LAB
ASCENDING AORTA: 3.36 CM
AV INDEX (PROSTH): 0.74
AV MEAN GRADIENT: 3 MMHG
AV PEAK GRADIENT: 7 MMHG
AV VALVE AREA: 2.21 CM2
AV VELOCITY RATIO: 0.89
BSA FOR ECHO PROCEDURE: 1.68 M2
CV ECHO LV RWT: 0.4 CM
DOP CALC AO PEAK VEL: 1.29 M/S
DOP CALC AO VTI: 35.9 CM
DOP CALC LVOT AREA: 3 CM2
DOP CALC LVOT DIAMETER: 1.95 CM
DOP CALC LVOT PEAK VEL: 1.15 M/S
DOP CALC LVOT STROKE VOLUME: 79.28 CM3
DOP CALCLVOT PEAK VEL VTI: 26.56 CM
E WAVE DECELERATION TIME: 134.66 MSEC
E/A RATIO: 0.72
E/E' RATIO: 7.2 M/S
ECHO LV POSTERIOR WALL: 0.89 CM (ref 0.6–1.1)
FRACTIONAL SHORTENING: 35 % (ref 28–44)
INTERVENTRICULAR SEPTUM: 0.7 CM (ref 0.6–1.1)
LA MAJOR: 4.56 CM
LA MINOR: 4.65 CM
LA WIDTH: 3.98 CM
LEFT ATRIUM SIZE: 3.29 CM
LEFT ATRIUM VOLUME INDEX: 30.8 ML/M2
LEFT ATRIUM VOLUME: 51.25 CM3
LEFT INTERNAL DIMENSION IN SYSTOLE: 2.9 CM (ref 2.1–4)
LEFT VENTRICLE DIASTOLIC VOLUME INDEX: 54.06 ML/M2
LEFT VENTRICLE DIASTOLIC VOLUME: 90.03 ML
LEFT VENTRICLE MASS INDEX: 66 G/M2
LEFT VENTRICLE SYSTOLIC VOLUME INDEX: 19.4 ML/M2
LEFT VENTRICLE SYSTOLIC VOLUME: 32.27 ML
LEFT VENTRICULAR INTERNAL DIMENSION IN DIASTOLE: 4.45 CM (ref 3.5–6)
LEFT VENTRICULAR MASS: 110.61 G
LV LATERAL E/E' RATIO: 6.75 M/S
LV SEPTAL E/E' RATIO: 7.71 M/S
MV PEAK A VEL: 0.75 M/S
MV PEAK E VEL: 0.54 M/S
MV STENOSIS PRESSURE HALF TIME: 39.05 MS
MV VALVE AREA P 1/2 METHOD: 5.63 CM2
PISA TR MAX VEL: 2.81 M/S
PULM VEIN S/D RATIO: 1.43
PV PEAK D VEL: 0.51 M/S
PV PEAK S VEL: 0.73 M/S
RA MAJOR: 4.43 CM
RA PRESSURE: 3 MMHG
RA WIDTH: 2.93 CM
RIGHT VENTRICULAR END-DIASTOLIC DIMENSION: 3.6 CM
SINUS: 3.06 CM
STJ: 3.18 CM
TDI LATERAL: 0.08 M/S
TDI SEPTAL: 0.07 M/S
TDI: 0.08 M/S
TR MAX PG: 32 MMHG
TRICUSPID ANNULAR PLANE SYSTOLIC EXCURSION: 1.7 CM
TV REST PULMONARY ARTERY PRESSURE: 35 MMHG

## 2020-10-02 ENCOUNTER — PATIENT OUTREACH (OUTPATIENT)
Dept: ADMINISTRATIVE | Facility: OTHER | Age: 80
End: 2020-10-02

## 2020-10-02 NOTE — PROGRESS NOTES
LINKS immunization registry updated  Care Everywhere updated  Health Maintenance updated  Chart reviewed for overdue Proactive [facility] ([facility]ASMEET) health maintenance testing (CRS, Breast Ca, Diabetic Eye Exam)   Orders entered:N/A

## 2020-10-04 NOTE — PROGRESS NOTES
Subjective:    Patient ID:  Jackie Landa is a 80 y.o. female who presents for follow-up of No chief complaint on file.      HPI  Here for follow up of CAD-PCI (2006)/PPM. Patients states is doing well no chest pain, SOB or change in exertional tolerence. Patient is exercising regularly with out symptoms.     Review of Systems   Constitution: Negative for malaise/fatigue.   Eyes: Negative for blurred vision.   Cardiovascular: Negative for chest pain, claudication, cyanosis, dyspnea on exertion, irregular heartbeat, leg swelling, near-syncope, orthopnea, palpitations, paroxysmal nocturnal dyspnea and syncope.   Respiratory: Negative for cough and shortness of breath.    Hematologic/Lymphatic: Does not bruise/bleed easily.   Musculoskeletal: Negative for back pain, falls, joint pain, muscle cramps, muscle weakness and myalgias.   Gastrointestinal: Negative for abdominal pain, change in bowel habit, nausea and vomiting.   Genitourinary: Negative for urgency.   Neurological: Negative for dizziness, focal weakness and light-headedness.       Past Medical History:   Diagnosis Date    A-fib     ARDS (adult respiratory distress syndrome) 2000    on ventilator for 14 days    Asthmatic bronchitis with acute exacerbation 2/25/2017    Basal cell carcinoma 03/2020    Right lower back     Bladder cancer     s/p BCG, interferon; cancer free since 1999, had twice, 1994 first    Cancer of overlapping sites of bladder 10/10/2017    Cardiac pacemaker in situ 9/27/2013    10/13 [employer] D[employer]      Coronary artery disease     9/2016-Mild, moderate stenosis. Pre and post-stent of the LAD verified by FFR.    Coronary artery disease involving native coronary artery of native heart with angina pectoris with documented spasm 2/22/2013 9/2016 [facility] Mild, moderate stenosis. Pre and post-stent of the LAD verified by FFR. Normal diastolic function and normal systolic function.    DDD (degenerative disc disease), cervical      Depression     Difficult intubation 10/2017    Had trauma and bruising to her mouth and tongue last intubation at [facility]    GERD (gastroesophageal reflux disease)     Hiatal hernia     HLD (hyperlipidemia)     Hypothyroidism     Intermittent self-catheterization of bladder     MVP (mitral valve prolapse)     Neurogenic bladder     Pacemaker     PAF (paroxysmal atrial fibrillation) 2/22/2013    On sotalol    S/P coronary artery stent placement - LAD 2006; Patent 09/2012 2/22/2013 2012 angio Patent LAD stent with 50-60 post stent dsx FFR 0.88     Urge incontinence of urine           Objective:     Vitals:    10/05/20 0940   BP: (!) 148/79   Pulse: (!) 59        Physical Exam   Constitutional: She is oriented to person, place, and time. She appears well-developed and well-nourished.   HENT:   Head: Normocephalic.   Eyes: Conjunctivae are normal.   Neck: Normal range of motion. Neck supple. No JVD present.   Cardiovascular: Normal rate, regular rhythm, normal heart sounds and intact distal pulses.   Pulses:       Carotid pulses are 2+ on the right side and 2+ on the left side.       Radial pulses are 2+ on the right side and 2+ on the left side.        Dorsalis pedis pulses are 2+ on the right side and 2+ on the left side.        Posterior tibial pulses are 2+ on the right side and 2+ on the left side.   Pacer site clean and dry, well healed.     Pulmonary/Chest: Effort normal and breath sounds normal.   Abdominal: Soft. Bowel sounds are normal.   Musculoskeletal:         General: No tenderness or edema.   Neurological: She is alert and oriented to person, place, and time. Gait normal.   Skin: Skin is warm, dry and intact. No cyanosis. Nails show no clubbing.   Psychiatric: She has a normal mood and affect. Her speech is normal and behavior is normal. Thought content normal.   Nursing note and vitals reviewed.            ..    Chemistry        Component Value Date/Time     09/28/2020 0751    K  4.3 09/28/2020 0751     09/28/2020 0751    CO2 31 (H) 09/28/2020 0751    BUN 17 09/28/2020 0751    CREATININE 1.0 09/28/2020 0751    GLU 93 09/28/2020 0751        Component Value Date/Time    CALCIUM 9.0 09/28/2020 0751    ALKPHOS 66 09/28/2020 0751    AST 27 09/28/2020 0751    ALT 22 09/28/2020 0751    BILITOT 1.4 (H) 09/28/2020 0751    ESTGFRAFRICA >60.0 09/28/2020 0751    EGFRNONAA 53.0 (A) 09/28/2020 0751            ..  Lab Results   Component Value Date    CHOL 150 09/28/2020    CHOL 158 09/16/2019    CHOL 171 12/28/2018     Lab Results   Component Value Date    HDL 62 09/28/2020    HDL 60 09/16/2019    HDL 67 12/28/2018     Lab Results   Component Value Date    LDLCALC 70.2 09/28/2020    LDLCALC 77.2 09/16/2019    LDLCALC 83.8 12/28/2018     Lab Results   Component Value Date    TRIG 89 09/28/2020    TRIG 104 09/16/2019    TRIG 101 12/28/2018     Lab Results   Component Value Date    CHOLHDL 41.3 09/28/2020    CHOLHDL 38.0 09/16/2019    CHOLHDL 39.2 12/28/2018     ..  Lab Results   Component Value Date    WBC 5.99 09/28/2020    HGB 12.9 09/28/2020    HCT 42.2 09/28/2020     (H) 09/28/2020     09/28/2020       Test(s) Reviewed  I have reviewed the following in detail:  [] Stress test   [] Angiography   [x] Echocardiogram   [x] Labs   [x] Pacer  4.5 years remaining longevity.  AP~83 %;  ~ 0 %  2 nsSVT events fastest 150s longest 3.5s.     Assessment:         ICD-10-CM ICD-9-CM   1. PAF (paroxysmal atrial fibrillation)  I48.0 427.31   2. Coronary artery disease involving native coronary artery of native heart with angina pectoris with documented spasm  I25.111 414.01     413.9   3. Bradycardia  R00.1 427.89   4. Cardiac pacemaker in situ  Z95.0 V45.01   5. S/P coronary artery stent placement - LAD 2006; Patent 09/2012  Z95.5 V45.82     Problem List Items Addressed This Visit     Bradycardia (Chronic)    Cardiac pacemaker in situ (Chronic)    Overview     10/13 [employer] D[employer]            Coronary artery disease involving native coronary artery of native heart with angina pectoris with documented spasm    Overview     9/2016  [facility]  Mild, moderate stenosis. Pre and post-stent of the LAD verified by FFR.  Normal diastolic function and normal systolic function.         PAF (paroxysmal atrial fibrillation) - Primary (Chronic)    Overview     On sotalol         S/P coronary artery stent placement - LAD 2006; Patent 09/2012    Overview     2012 angio Patent LAD stent with 50-60 post stent dsx FFR 0.88                Plan:           Return to clinic 1 year   Low level/low impact aerobic exercise 5x's/wk. Heart healthy diet and risk factor modification.    See labs and med orders.      Portions of this note may have been created with voice recognition software.  Grammatical, syntax and spelling errors may be inevitable.

## 2020-10-05 ENCOUNTER — OFFICE VISIT (OUTPATIENT)
Dept: CARDIOLOGY | Facility: CLINIC | Age: 80
End: 2020-10-05
Attending: INTERNAL MEDICINE
Payer: MEDICARE

## 2020-10-05 VITALS
HEIGHT: 64 IN | BODY MASS INDEX: 22.99 KG/M2 | DIASTOLIC BLOOD PRESSURE: 79 MMHG | HEART RATE: 59 BPM | SYSTOLIC BLOOD PRESSURE: 148 MMHG | WEIGHT: 134.69 LBS

## 2020-10-05 DIAGNOSIS — I25.111 CORONARY ARTERY DISEASE INVOLVING NATIVE CORONARY ARTERY OF NATIVE HEART WITH ANGINA PECTORIS WITH DOCUMENTED SPASM: ICD-10-CM

## 2020-10-05 DIAGNOSIS — R00.1 BRADYCARDIA: Chronic | ICD-10-CM

## 2020-10-05 DIAGNOSIS — Z95.0 CARDIAC PACEMAKER IN SITU: Chronic | ICD-10-CM

## 2020-10-05 DIAGNOSIS — Z95.5 S/P CORONARY ARTERY STENT PLACEMENT: ICD-10-CM

## 2020-10-05 DIAGNOSIS — I48.0 PAF (PAROXYSMAL ATRIAL FIBRILLATION): Primary | Chronic | ICD-10-CM

## 2020-10-05 PROCEDURE — 1101F PT FALLS ASSESS-DOCD LE1/YR: CPT | Mod: CPTII,S$GLB,, | Performed by: INTERNAL MEDICINE

## 2020-10-05 PROCEDURE — 99214 OFFICE O/P EST MOD 30 MIN: CPT | Mod: S$GLB,,, | Performed by: INTERNAL MEDICINE

## 2020-10-05 PROCEDURE — 1126F AMNT PAIN NOTED NONE PRSNT: CPT | Mod: S$GLB,,, | Performed by: INTERNAL MEDICINE

## 2020-10-05 PROCEDURE — 1126F PR PAIN SEVERITY QUANTIFIED, NO PAIN PRESENT: ICD-10-PCS | Mod: S$GLB,,, | Performed by: INTERNAL MEDICINE

## 2020-10-05 PROCEDURE — 1159F MED LIST DOCD IN RCRD: CPT | Mod: S$GLB,,, | Performed by: INTERNAL MEDICINE

## 2020-10-05 PROCEDURE — 3077F PR MOST RECENT SYSTOLIC BLOOD PRESSURE >= 140 MM HG: ICD-10-PCS | Mod: CPTII,S$GLB,, | Performed by: INTERNAL MEDICINE

## 2020-10-05 PROCEDURE — 99999 PR PBB SHADOW E&M-EST. PATIENT-LVL III: CPT | Mod: PBBFAC,,, | Performed by: INTERNAL MEDICINE

## 2020-10-05 PROCEDURE — 1159F PR MEDICATION LIST DOCUMENTED IN MEDICAL RECORD: ICD-10-PCS | Mod: S$GLB,,, | Performed by: INTERNAL MEDICINE

## 2020-10-05 PROCEDURE — 99499 UNLISTED E&M SERVICE: CPT | Mod: S$GLB,,, | Performed by: INTERNAL MEDICINE

## 2020-10-05 PROCEDURE — 3078F PR MOST RECENT DIASTOLIC BLOOD PRESSURE < 80 MM HG: ICD-10-PCS | Mod: CPTII,S$GLB,, | Performed by: INTERNAL MEDICINE

## 2020-10-05 PROCEDURE — 1101F PR PT FALLS ASSESS DOC 0-1 FALLS W/OUT INJ PAST YR: ICD-10-PCS | Mod: CPTII,S$GLB,, | Performed by: INTERNAL MEDICINE

## 2020-10-05 PROCEDURE — 99214 PR OFFICE/OUTPT VISIT, EST, LEVL IV, 30-39 MIN: ICD-10-PCS | Mod: S$GLB,,, | Performed by: INTERNAL MEDICINE

## 2020-10-05 PROCEDURE — 99499 RISK ADDL DX/OHS AUDIT: ICD-10-PCS | Mod: S$GLB,,, | Performed by: INTERNAL MEDICINE

## 2020-10-05 PROCEDURE — 3078F DIAST BP <80 MM HG: CPT | Mod: CPTII,S$GLB,, | Performed by: INTERNAL MEDICINE

## 2020-10-05 PROCEDURE — 3077F SYST BP >= 140 MM HG: CPT | Mod: CPTII,S$GLB,, | Performed by: INTERNAL MEDICINE

## 2020-10-05 PROCEDURE — 99999 PR PBB SHADOW E&M-EST. PATIENT-LVL III: ICD-10-PCS | Mod: PBBFAC,,, | Performed by: INTERNAL MEDICINE

## 2020-11-02 DIAGNOSIS — L25.9 CONTACT DERMATITIS, UNSPECIFIED CONTACT DERMATITIS TYPE, UNSPECIFIED TRIGGER: ICD-10-CM

## 2020-11-04 RX ORDER — HYDROCORTISONE 25 MG/G
CREAM TOPICAL 2 TIMES DAILY
Qty: 28 G | Refills: 1 | Status: SHIPPED | OUTPATIENT
Start: 2020-11-04 | End: 2021-05-12

## 2020-11-17 ENCOUNTER — PATIENT OUTREACH (OUTPATIENT)
Dept: ADMINISTRATIVE | Facility: HOSPITAL | Age: 80
End: 2020-11-17

## 2020-11-17 NOTE — PROGRESS NOTES
HTN GAP REPORT  LAST OFFICE VISIT DOCUMENTED READING WAS >140/90.  [employer]      REQUESTING A REMOTE HOME BLOOD PRESSURE READING TO DOCUMENT IN DISCRETE FIELD OR PATIENT NEEDS TO SCHEDULE AN OFFICE VISIT OR NURSE VISIT FOR A BP CHECK

## 2020-11-19 ENCOUNTER — PATIENT MESSAGE (OUTPATIENT)
Dept: ADMINISTRATIVE | Facility: HOSPITAL | Age: 80
End: 2020-11-19

## 2020-11-20 ENCOUNTER — TELEPHONE (OUTPATIENT)
Dept: FAMILY MEDICINE | Facility: CLINIC | Age: 80
End: 2020-11-20

## 2020-11-27 ENCOUNTER — CLINICAL SUPPORT (OUTPATIENT)
Dept: CARDIOLOGY | Facility: CLINIC | Age: 80
End: 2020-11-27
Payer: MEDICARE

## 2020-11-27 DIAGNOSIS — Z95.0 PRESENCE OF CARDIAC PACEMAKER: ICD-10-CM

## 2020-11-27 PROCEDURE — 93296 REM INTERROG EVL PM/IDS: CPT | Mod: S$GLB,,, | Performed by: INTERNAL MEDICINE

## 2020-11-27 PROCEDURE — 93296 CARDIAC DEVICE CHECK - REMOTE: ICD-10-PCS | Mod: S$GLB,,, | Performed by: INTERNAL MEDICINE

## 2020-11-27 PROCEDURE — 93294 CARDIAC DEVICE CHECK - REMOTE: ICD-10-PCS | Mod: S$GLB,,, | Performed by: INTERNAL MEDICINE

## 2020-11-27 PROCEDURE — 93294 REM INTERROG EVL PM/LDLS PM: CPT | Mod: S$GLB,,, | Performed by: INTERNAL MEDICINE

## 2020-12-15 ENCOUNTER — TELEPHONE (OUTPATIENT)
Dept: FAMILY MEDICINE | Facility: CLINIC | Age: 80
End: 2020-12-15

## 2020-12-15 DIAGNOSIS — C67.9 MALIGNANT NEOPLASM OF URINARY BLADDER, UNSPECIFIED SITE: Primary | ICD-10-CM

## 2020-12-15 DIAGNOSIS — E03.4 HYPOTHYROIDISM DUE TO ACQUIRED ATROPHY OF THYROID: ICD-10-CM

## 2020-12-15 DIAGNOSIS — N31.9 NEUROGENIC DYSFUNCTION OF THE URINARY BLADDER: ICD-10-CM

## 2020-12-15 NOTE — TELEPHONE ENCOUNTER
Please see request for refill   LOV 1/2020    Patient due for annual and labs next month.     Do you want to add any labs to those pended?

## 2020-12-15 NOTE — TELEPHONE ENCOUNTER
----- Message from Ana Leung MA sent at 12/14/2020 11:26 AM CST -----  N is requesting new orders for cath supplies   N call back # [phone]  Fax # [phone]

## 2021-01-08 ENCOUNTER — TELEPHONE (OUTPATIENT)
Dept: CARDIOLOGY | Facility: CLINIC | Age: 81
End: 2021-01-08

## 2021-01-15 ENCOUNTER — TELEPHONE (OUTPATIENT)
Dept: CARDIOLOGY | Facility: CLINIC | Age: 81
End: 2021-01-15

## 2021-02-18 DIAGNOSIS — I48.0 PAF (PAROXYSMAL ATRIAL FIBRILLATION): ICD-10-CM

## 2021-02-18 DIAGNOSIS — Z95.0 PRESENCE OF CARDIAC PACEMAKER: Primary | ICD-10-CM

## 2021-02-25 ENCOUNTER — CLINICAL SUPPORT (OUTPATIENT)
Dept: CARDIOLOGY | Facility: CLINIC | Age: 81
End: 2021-02-25
Payer: MEDICARE

## 2021-02-25 DIAGNOSIS — Z95.0 PRESENCE OF CARDIAC PACEMAKER: ICD-10-CM

## 2021-02-25 PROCEDURE — 93294 REM INTERROG EVL PM/LDLS PM: CPT | Mod: ,,, | Performed by: INTERNAL MEDICINE

## 2021-02-25 PROCEDURE — 93294 CARDIAC DEVICE CHECK - REMOTE: ICD-10-PCS | Mod: ,,, | Performed by: INTERNAL MEDICINE

## 2021-02-25 PROCEDURE — 93296 CARDIAC DEVICE CHECK - REMOTE: ICD-10-PCS | Mod: ,,, | Performed by: INTERNAL MEDICINE

## 2021-02-25 PROCEDURE — 93296 REM INTERROG EVL PM/IDS: CPT | Mod: ,,, | Performed by: INTERNAL MEDICINE

## 2021-03-09 RX ORDER — FLUTICASONE FUROATE AND VILANTEROL TRIFENATATE 100; 25 UG/1; UG/1
1 POWDER RESPIRATORY (INHALATION) DAILY
Qty: 180 EACH | Refills: 3 | Status: SHIPPED | OUTPATIENT
Start: 2021-03-09

## 2021-03-10 ENCOUNTER — IMMUNIZATION (OUTPATIENT)
Dept: FAMILY MEDICINE | Facility: CLINIC | Age: 81
End: 2021-03-10
Payer: MEDICARE

## 2021-03-10 DIAGNOSIS — Z23 NEED FOR VACCINATION: Primary | ICD-10-CM

## 2021-03-10 PROCEDURE — 91300 COVID-19, MRNA, LNP-S, PF, 30 MCG/0.3 ML DOSE VACCINE: CPT | Mod: PBBFAC | Performed by: FAMILY MEDICINE

## 2021-03-11 RX ORDER — OMEPRAZOLE 20 MG/1
20 CAPSULE, DELAYED RELEASE ORAL DAILY
Qty: 90 CAPSULE | Refills: 3 | Status: SHIPPED | OUTPATIENT
Start: 2021-03-11

## 2021-03-16 ENCOUNTER — CLINICAL SUPPORT (OUTPATIENT)
Dept: CARDIOLOGY | Facility: CLINIC | Age: 81
End: 2021-03-16
Attending: INTERNAL MEDICINE
Payer: MEDICARE

## 2021-03-16 ENCOUNTER — HOSPITAL ENCOUNTER (OUTPATIENT)
Dept: RADIOLOGY | Facility: HOSPITAL | Age: 81
Discharge: HOME | End: 2021-03-16
Attending: FAMILY MEDICINE
Payer: MEDICARE

## 2021-03-16 DIAGNOSIS — Z12.31 ENCOUNTER FOR SCREENING MAMMOGRAM FOR MALIGNANT NEOPLASM OF BREAST: ICD-10-CM

## 2021-03-16 DIAGNOSIS — Z95.0 PRESENCE OF CARDIAC PACEMAKER: ICD-10-CM

## 2021-03-16 DIAGNOSIS — I48.0 PAF (PAROXYSMAL ATRIAL FIBRILLATION): ICD-10-CM

## 2021-03-16 PROCEDURE — 77067 MAMMO DIGITAL SCREENING BILAT WITH TOMO: ICD-10-PCS | Mod: 26,,, | Performed by: RADIOLOGY

## 2021-03-16 PROCEDURE — 77063 MAMMO DIGITAL SCREENING BILAT WITH TOMO: ICD-10-PCS | Mod: 26,,, | Performed by: RADIOLOGY

## 2021-03-16 PROCEDURE — 77067 SCR MAMMO BI INCL CAD: CPT | Mod: 26,,, | Performed by: RADIOLOGY

## 2021-03-16 PROCEDURE — 77067 SCR MAMMO BI INCL CAD: CPT | Mod: TC,PO

## 2021-03-16 PROCEDURE — 77063 BREAST TOMOSYNTHESIS BI: CPT | Mod: 26,,, | Performed by: RADIOLOGY

## 2021-03-30 ENCOUNTER — IMMUNIZATION (OUTPATIENT)
Dept: FAMILY MEDICINE | Facility: CLINIC | Age: 81
End: 2021-03-30
Payer: MEDICARE

## 2021-03-30 DIAGNOSIS — Z23 NEED FOR VACCINATION: Primary | ICD-10-CM

## 2021-03-30 PROCEDURE — 0002A COVID-19, MRNA, LNP-S, PF, 30 MCG/0.3 ML DOSE VACCINE: CPT | Mod: PBBFAC | Performed by: FAMILY MEDICINE

## 2021-03-30 PROCEDURE — 91300 COVID-19, MRNA, LNP-S, PF, 30 MCG/0.3 ML DOSE VACCINE: CPT | Mod: PBBFAC | Performed by: FAMILY MEDICINE

## 2021-04-06 ENCOUNTER — PATIENT MESSAGE (OUTPATIENT)
Dept: ADMINISTRATIVE | Facility: HOSPITAL | Age: 81
End: 2021-04-06

## 2021-04-06 ENCOUNTER — TELEPHONE (OUTPATIENT)
Dept: FAMILY MEDICINE | Facility: CLINIC | Age: 81
End: 2021-04-06

## 2021-04-06 DIAGNOSIS — N31.9 NEUROGENIC DYSFUNCTION OF THE URINARY BLADDER: ICD-10-CM

## 2021-05-04 ENCOUNTER — PATIENT MESSAGE (OUTPATIENT)
Dept: FAMILY MEDICINE | Facility: CLINIC | Age: 81
End: 2021-05-04

## 2021-05-04 DIAGNOSIS — E03.4 HYPOTHYROIDISM DUE TO ACQUIRED ATROPHY OF THYROID: ICD-10-CM

## 2021-05-04 RX ORDER — OXYBUTYNIN CHLORIDE 15 MG/1
15 TABLET, EXTENDED RELEASE ORAL DAILY
Qty: 90 TABLET | Refills: 3 | Status: SHIPPED | OUTPATIENT
Start: 2021-05-04

## 2021-05-04 RX ORDER — LEVOTHYROXINE SODIUM 88 UG/1
88 TABLET ORAL DAILY
Qty: 90 TABLET | Refills: 0 | Status: SHIPPED | OUTPATIENT
Start: 2021-05-04 | End: 2021-07-19

## 2021-05-12 ENCOUNTER — OFFICE VISIT (OUTPATIENT)
Dept: FAMILY MEDICINE | Facility: CLINIC | Age: 81
End: 2021-05-12
Payer: MEDICARE

## 2021-05-12 VITALS
TEMPERATURE: 98 F | DIASTOLIC BLOOD PRESSURE: 60 MMHG | BODY MASS INDEX: 23.64 KG/M2 | WEIGHT: 138.44 LBS | SYSTOLIC BLOOD PRESSURE: 116 MMHG | HEART RATE: 60 BPM | HEIGHT: 64 IN | RESPIRATION RATE: 18 BRPM

## 2021-05-12 DIAGNOSIS — E03.4 HYPOTHYROIDISM DUE TO ACQUIRED ATROPHY OF THYROID: ICD-10-CM

## 2021-05-12 DIAGNOSIS — Z00.00 PREVENTATIVE HEALTH CARE: Primary | ICD-10-CM

## 2021-05-12 PROCEDURE — 99999 PR PBB SHADOW E&M-EST. PATIENT-LVL III: ICD-10-PCS | Mod: PBBFAC,,, | Performed by: FAMILY MEDICINE

## 2021-05-12 PROCEDURE — 99214 PR OFFICE/OUTPT VISIT, EST, LEVL IV, 30-39 MIN: ICD-10-PCS | Mod: S$GLB,,, | Performed by: FAMILY MEDICINE

## 2021-05-12 PROCEDURE — 1126F PR PAIN SEVERITY QUANTIFIED, NO PAIN PRESENT: ICD-10-PCS | Mod: S$GLB,,, | Performed by: FAMILY MEDICINE

## 2021-05-12 PROCEDURE — 99214 OFFICE O/P EST MOD 30 MIN: CPT | Mod: S$GLB,,, | Performed by: FAMILY MEDICINE

## 2021-05-12 PROCEDURE — 3288F FALL RISK ASSESSMENT DOCD: CPT | Mod: CPTII,S$GLB,, | Performed by: FAMILY MEDICINE

## 2021-05-12 PROCEDURE — 99499 UNLISTED E&M SERVICE: CPT | Mod: S$GLB,,, | Performed by: FAMILY MEDICINE

## 2021-05-12 PROCEDURE — 1101F PR PT FALLS ASSESS DOC 0-1 FALLS W/OUT INJ PAST YR: ICD-10-PCS | Mod: CPTII,S$GLB,, | Performed by: FAMILY MEDICINE

## 2021-05-12 PROCEDURE — 1101F PT FALLS ASSESS-DOCD LE1/YR: CPT | Mod: CPTII,S$GLB,, | Performed by: FAMILY MEDICINE

## 2021-05-12 PROCEDURE — 99999 PR PBB SHADOW E&M-EST. PATIENT-LVL III: CPT | Mod: PBBFAC,,, | Performed by: FAMILY MEDICINE

## 2021-05-12 PROCEDURE — 1157F PR ADVANCE CARE PLAN OR EQUIV PRESENT IN MEDICAL RECORD: ICD-10-PCS | Mod: S$GLB,,, | Performed by: FAMILY MEDICINE

## 2021-05-12 PROCEDURE — 99499 RISK ADDL DX/OHS AUDIT: ICD-10-PCS | Mod: S$GLB,,, | Performed by: FAMILY MEDICINE

## 2021-05-12 PROCEDURE — 3288F PR FALLS RISK ASSESSMENT DOCUMENTED: ICD-10-PCS | Mod: CPTII,S$GLB,, | Performed by: FAMILY MEDICINE

## 2021-05-12 PROCEDURE — 1126F AMNT PAIN NOTED NONE PRSNT: CPT | Mod: S$GLB,,, | Performed by: FAMILY MEDICINE

## 2021-05-12 PROCEDURE — 1157F ADVNC CARE PLAN IN RCRD: CPT | Mod: S$GLB,,, | Performed by: FAMILY MEDICINE

## 2021-05-26 ENCOUNTER — CLINICAL SUPPORT (OUTPATIENT)
Dept: CARDIOLOGY | Facility: CLINIC | Age: 81
End: 2021-05-26
Payer: MEDICARE

## 2021-05-26 DIAGNOSIS — Z95.0 PRESENCE OF CARDIAC PACEMAKER: ICD-10-CM

## 2021-05-26 PROCEDURE — 93294 REM INTERROG EVL PM/LDLS PM: CPT | Mod: S$GLB,,, | Performed by: INTERNAL MEDICINE

## 2021-05-26 PROCEDURE — 93296 CARDIAC DEVICE CHECK - REMOTE: ICD-10-PCS | Mod: S$GLB,,, | Performed by: INTERNAL MEDICINE

## 2021-05-26 PROCEDURE — 93296 REM INTERROG EVL PM/IDS: CPT | Mod: S$GLB,,, | Performed by: INTERNAL MEDICINE

## 2021-05-26 PROCEDURE — 93294 CARDIAC DEVICE CHECK - REMOTE: ICD-10-PCS | Mod: S$GLB,,, | Performed by: INTERNAL MEDICINE

## 2021-06-22 DIAGNOSIS — N31.9 NEUROGENIC DYSFUNCTION OF THE URINARY BLADDER: ICD-10-CM

## 2021-07-15 ENCOUNTER — NURSE TRIAGE (OUTPATIENT)
Dept: ADMINISTRATIVE | Facility: CLINIC | Age: 81
End: 2021-07-15

## 2021-07-15 DIAGNOSIS — E03.4 HYPOTHYROIDISM DUE TO ACQUIRED ATROPHY OF THYROID: ICD-10-CM

## 2021-07-19 ENCOUNTER — OFFICE VISIT (OUTPATIENT)
Dept: FAMILY MEDICINE | Facility: CLINIC | Age: 81
End: 2021-07-19
Payer: MEDICARE

## 2021-07-19 ENCOUNTER — HOSPITAL ENCOUNTER (OUTPATIENT)
Dept: RADIOLOGY | Facility: HOSPITAL | Age: 81
Discharge: HOME | End: 2021-07-19
Attending: NURSE PRACTITIONER
Payer: MEDICARE

## 2021-07-19 VITALS
SYSTOLIC BLOOD PRESSURE: 118 MMHG | BODY MASS INDEX: 23.18 KG/M2 | OXYGEN SATURATION: 98 % | HEART RATE: 60 BPM | DIASTOLIC BLOOD PRESSURE: 76 MMHG | TEMPERATURE: 98 F | WEIGHT: 135.06 LBS

## 2021-07-19 DIAGNOSIS — R42 EPISODIC LIGHTHEADEDNESS: Primary | ICD-10-CM

## 2021-07-19 DIAGNOSIS — R53.83 FATIGUE, UNSPECIFIED TYPE: ICD-10-CM

## 2021-07-19 DIAGNOSIS — F43.29 STRESS AND ADJUSTMENT REACTION: ICD-10-CM

## 2021-07-19 DIAGNOSIS — R07.89 CHEST TIGHTNESS: ICD-10-CM

## 2021-07-19 PROCEDURE — 93010 EKG 12-LEAD: ICD-10-PCS | Mod: S$GLB,,, | Performed by: INTERNAL MEDICINE

## 2021-07-19 PROCEDURE — 1101F PR PT FALLS ASSESS DOC 0-1 FALLS W/OUT INJ PAST YR: ICD-10-PCS | Mod: CPTII,S$GLB,, | Performed by: NURSE PRACTITIONER

## 2021-07-19 PROCEDURE — 99999 PR PBB SHADOW E&M-EST. PATIENT-LVL V: CPT | Mod: PBBFAC,,, | Performed by: NURSE PRACTITIONER

## 2021-07-19 PROCEDURE — 71046 XR CHEST PA AND LATERAL: ICD-10-PCS | Mod: 26,,, | Performed by: RADIOLOGY

## 2021-07-19 PROCEDURE — 71046 X-RAY EXAM CHEST 2 VIEWS: CPT | Mod: 26,,, | Performed by: RADIOLOGY

## 2021-07-19 PROCEDURE — 1157F PR ADVANCE CARE PLAN OR EQUIV PRESENT IN MEDICAL RECORD: ICD-10-PCS | Mod: CPTII,S$GLB,, | Performed by: NURSE PRACTITIONER

## 2021-07-19 PROCEDURE — 99999 PR PBB SHADOW E&M-EST. PATIENT-LVL V: ICD-10-PCS | Mod: PBBFAC,,, | Performed by: NURSE PRACTITIONER

## 2021-07-19 PROCEDURE — 3288F PR FALLS RISK ASSESSMENT DOCUMENTED: ICD-10-PCS | Mod: CPTII,S$GLB,, | Performed by: NURSE PRACTITIONER

## 2021-07-19 PROCEDURE — 1126F AMNT PAIN NOTED NONE PRSNT: CPT | Mod: CPTII,S$GLB,, | Performed by: NURSE PRACTITIONER

## 2021-07-19 PROCEDURE — 1159F MED LIST DOCD IN RCRD: CPT | Mod: CPTII,S$GLB,, | Performed by: NURSE PRACTITIONER

## 2021-07-19 PROCEDURE — 99214 PR OFFICE/OUTPT VISIT, EST, LEVL IV, 30-39 MIN: ICD-10-PCS | Mod: S$GLB,,, | Performed by: NURSE PRACTITIONER

## 2021-07-19 PROCEDURE — 71046 X-RAY EXAM CHEST 2 VIEWS: CPT | Mod: TC,FY,PO

## 2021-07-19 PROCEDURE — 1157F ADVNC CARE PLAN IN RCRD: CPT | Mod: CPTII,S$GLB,, | Performed by: NURSE PRACTITIONER

## 2021-07-19 PROCEDURE — 1159F PR MEDICATION LIST DOCUMENTED IN MEDICAL RECORD: ICD-10-PCS | Mod: CPTII,S$GLB,, | Performed by: NURSE PRACTITIONER

## 2021-07-19 PROCEDURE — 93005 EKG 12-LEAD: ICD-10-PCS | Mod: S$GLB,,, | Performed by: NURSE PRACTITIONER

## 2021-07-19 PROCEDURE — 1126F PR PAIN SEVERITY QUANTIFIED, NO PAIN PRESENT: ICD-10-PCS | Mod: CPTII,S$GLB,, | Performed by: NURSE PRACTITIONER

## 2021-07-19 PROCEDURE — 93010 ELECTROCARDIOGRAM REPORT: CPT | Mod: S$GLB,,, | Performed by: INTERNAL MEDICINE

## 2021-07-19 PROCEDURE — 1101F PT FALLS ASSESS-DOCD LE1/YR: CPT | Mod: CPTII,S$GLB,, | Performed by: NURSE PRACTITIONER

## 2021-07-19 PROCEDURE — 3288F FALL RISK ASSESSMENT DOCD: CPT | Mod: CPTII,S$GLB,, | Performed by: NURSE PRACTITIONER

## 2021-07-19 PROCEDURE — 93005 ELECTROCARDIOGRAM TRACING: CPT | Mod: S$GLB,,, | Performed by: NURSE PRACTITIONER

## 2021-07-19 PROCEDURE — 99214 OFFICE O/P EST MOD 30 MIN: CPT | Mod: S$GLB,,, | Performed by: NURSE PRACTITIONER

## 2021-07-19 RX ORDER — LEVOTHYROXINE SODIUM 88 UG/1
TABLET ORAL
Qty: 90 TABLET | Refills: 0 | Status: SHIPPED | OUTPATIENT
Start: 2021-07-19 | End: 2021-10-24 | Stop reason: SDUPTHER

## 2021-07-20 ENCOUNTER — LAB VISIT (OUTPATIENT)
Dept: LAB | Facility: HOSPITAL | Age: 81
End: 2021-07-20
Attending: FAMILY MEDICINE
Payer: MEDICARE

## 2021-07-20 DIAGNOSIS — R42 EPISODIC LIGHTHEADEDNESS: ICD-10-CM

## 2021-07-20 LAB
BACTERIA #/AREA URNS HPF: ABNORMAL /HPF
BILIRUB UR QL STRIP: NEGATIVE
CLARITY UR: CLEAR
COLOR UR: YELLOW
GLUCOSE UR QL STRIP: NEGATIVE
HGB UR QL STRIP: ABNORMAL
KETONES UR QL STRIP: NEGATIVE
LEUKOCYTE ESTERASE UR QL STRIP: ABNORMAL
MICROSCOPIC COMMENT: ABNORMAL
NITRITE UR QL STRIP: NEGATIVE
PH UR STRIP: 8 [PH] (ref 5–8)
PROT UR QL STRIP: NEGATIVE
RBC #/AREA URNS HPF: 2 /HPF (ref 0–4)
SP GR UR STRIP: 1.02 (ref 1–1.03)
SQUAMOUS #/AREA URNS HPF: 2 /HPF
URN SPEC COLLECT METH UR: ABNORMAL
WBC #/AREA URNS HPF: 15 /HPF (ref 0–5)

## 2021-07-20 PROCEDURE — 81000 URINALYSIS NONAUTO W/SCOPE: CPT | Mod: PO | Performed by: NURSE PRACTITIONER

## 2021-07-20 PROCEDURE — 87186 SC STD MICRODIL/AGAR DIL: CPT | Performed by: NURSE PRACTITIONER

## 2021-07-20 PROCEDURE — 87088 URINE BACTERIA CULTURE: CPT | Performed by: NURSE PRACTITIONER

## 2021-07-20 PROCEDURE — 87086 URINE CULTURE/COLONY COUNT: CPT | Performed by: NURSE PRACTITIONER

## 2021-07-20 PROCEDURE — 87077 CULTURE AEROBIC IDENTIFY: CPT | Mod: 59 | Performed by: NURSE PRACTITIONER

## 2021-07-23 DIAGNOSIS — N39.0 URINARY TRACT INFECTION WITHOUT HEMATURIA, SITE UNSPECIFIED: Primary | ICD-10-CM

## 2021-07-23 LAB
BACTERIA UR CULT: ABNORMAL
BACTERIA UR CULT: ABNORMAL

## 2021-07-23 RX ORDER — AMOXICILLIN AND CLAVULANATE POTASSIUM 875; 125 MG/1; MG/1
1 TABLET, FILM COATED ORAL EVERY 12 HOURS
Qty: 20 TABLET | Refills: 0 | Status: SHIPPED | OUTPATIENT
Start: 2021-07-23 | End: 2021-08-02

## 2021-08-24 ENCOUNTER — CLINICAL SUPPORT (OUTPATIENT)
Dept: CARDIOLOGY | Facility: HOSPITAL | Age: 81
End: 2021-08-24
Payer: MEDICARE

## 2021-08-24 ENCOUNTER — HOSPITAL ENCOUNTER (OUTPATIENT)
Dept: CARDIOLOGY | Facility: HOSPITAL | Age: 81
Discharge: HOME | End: 2021-08-24
Payer: MEDICARE

## 2021-08-24 DIAGNOSIS — Z95.0 PRESENCE OF CARDIAC PACEMAKER: ICD-10-CM

## 2021-08-24 PROCEDURE — 93294 CARDIAC DEVICE CHECK - REMOTE: ICD-10-PCS | Mod: ,,, | Performed by: INTERNAL MEDICINE

## 2021-08-24 PROCEDURE — 93296 REM INTERROG EVL PM/IDS: CPT | Mod: PO | Performed by: INTERNAL MEDICINE

## 2021-08-24 PROCEDURE — 93294 REM INTERROG EVL PM/LDLS PM: CPT | Mod: ,,, | Performed by: INTERNAL MEDICINE

## 2021-08-26 RX ORDER — SIMVASTATIN 40 MG/1
TABLET, FILM COATED ORAL
Qty: 90 TABLET | Refills: 4 | Status: SHIPPED | OUTPATIENT
Start: 2021-08-26 | End: 2022-08-26

## 2021-10-08 ENCOUNTER — PATIENT MESSAGE (OUTPATIENT)
Dept: CARDIOLOGY | Facility: CLINIC | Age: 81
End: 2021-10-08

## 2021-10-08 DIAGNOSIS — Z03.818 ENCNTR FOR OBS FOR SUSP EXPSR TO OTH BIOLG AGENTS RULED OUT: Primary | ICD-10-CM

## 2021-10-24 DIAGNOSIS — N31.9 NEUROGENIC DYSFUNCTION OF THE URINARY BLADDER: ICD-10-CM

## 2021-10-24 DIAGNOSIS — E03.4 HYPOTHYROIDISM DUE TO ACQUIRED ATROPHY OF THYROID: ICD-10-CM

## 2021-10-25 RX ORDER — LEVOTHYROXINE SODIUM 88 UG/1
88 TABLET ORAL DAILY
Qty: 90 TABLET | Refills: 3 | Status: SHIPPED | OUTPATIENT
Start: 2021-10-25

## 2021-10-29 ENCOUNTER — PES CALL (OUTPATIENT)
Dept: ADMINISTRATIVE | Facility: CLINIC | Age: 81
End: 2021-10-29
Payer: MEDICARE

## 2021-11-22 ENCOUNTER — CLINICAL SUPPORT (OUTPATIENT)
Dept: CARDIOLOGY | Facility: HOSPITAL | Age: 81
End: 2021-11-22
Payer: MEDICARE

## 2021-11-22 DIAGNOSIS — Z95.0 PRESENCE OF CARDIAC PACEMAKER: ICD-10-CM

## 2021-11-22 PROCEDURE — 93294 REM INTERROG EVL PM/LDLS PM: CPT | Mod: ,,, | Performed by: INTERNAL MEDICINE

## 2021-11-22 PROCEDURE — 93294 CARDIAC DEVICE CHECK - REMOTE: ICD-10-PCS | Mod: ,,, | Performed by: INTERNAL MEDICINE

## 2021-11-22 PROCEDURE — 93296 REM INTERROG EVL PM/IDS: CPT | Mod: PO | Performed by: INTERNAL MEDICINE

## 2021-11-29 ENCOUNTER — LAB VISIT (OUTPATIENT)
Dept: LAB | Facility: HOSPITAL | Age: 81
End: 2021-11-29
Attending: INTERNAL MEDICINE
Payer: MEDICARE

## 2021-11-29 ENCOUNTER — CLINICAL SUPPORT (OUTPATIENT)
Dept: CARDIOLOGY | Facility: HOSPITAL | Age: 81
End: 2021-11-29
Attending: INTERNAL MEDICINE
Payer: MEDICARE

## 2021-11-29 ENCOUNTER — TELEPHONE (OUTPATIENT)
Dept: CARDIOLOGY | Facility: HOSPITAL | Age: 81
End: 2021-11-29
Payer: MEDICARE

## 2021-11-29 VITALS — WEIGHT: 134 LBS | HEIGHT: 64 IN | BODY MASS INDEX: 22.88 KG/M2 | HEART RATE: 60 BPM

## 2021-11-29 DIAGNOSIS — Z03.818 ENCNTR FOR OBS FOR SUSP EXPSR TO OTH BIOLG AGENTS RULED OUT: ICD-10-CM

## 2021-11-29 DIAGNOSIS — R00.1 BRADYCARDIA: ICD-10-CM

## 2021-11-29 DIAGNOSIS — Z95.5 S/P CORONARY ARTERY STENT PLACEMENT: ICD-10-CM

## 2021-11-29 DIAGNOSIS — Z95.0 CARDIAC PACEMAKER IN SITU: Chronic | ICD-10-CM

## 2021-11-29 DIAGNOSIS — I48.0 PAF (PAROXYSMAL ATRIAL FIBRILLATION): Chronic | ICD-10-CM

## 2021-11-29 DIAGNOSIS — R00.1 BRADYCARDIA: Chronic | ICD-10-CM

## 2021-11-29 DIAGNOSIS — I48.0 PAF (PAROXYSMAL ATRIAL FIBRILLATION): ICD-10-CM

## 2021-11-29 DIAGNOSIS — Z95.0 CARDIAC PACEMAKER IN SITU: ICD-10-CM

## 2021-11-29 DIAGNOSIS — I25.111 CORONARY ARTERY DISEASE INVOLVING NATIVE CORONARY ARTERY OF NATIVE HEART WITH ANGINA PECTORIS WITH DOCUMENTED SPASM: ICD-10-CM

## 2021-11-29 LAB
ALBUMIN SERPL BCP-MCNC: 4.1 G/DL (ref 3.5–5.2)
ALP SERPL-CCNC: 81 U/L (ref 55–135)
ALT SERPL W/O P-5'-P-CCNC: 26 U/L (ref 10–44)
ANION GAP SERPL CALC-SCNC: 9 MMOL/L (ref 8–16)
AST SERPL-CCNC: 28 U/L (ref 10–40)
BASOPHILS # BLD AUTO: 0.03 K/UL (ref 0–0.2)
BASOPHILS NFR BLD: 0.5 % (ref 0–1.9)
BILIRUB SERPL-MCNC: 1.7 MG/DL (ref 0.1–1)
BUN SERPL-MCNC: 16 MG/DL (ref 8–23)
CALCIUM SERPL-MCNC: 9.5 MG/DL (ref 8.7–10.5)
CHLORIDE SERPL-SCNC: 106 MMOL/L (ref 95–110)
CHOLEST SERPL-MCNC: 156 MG/DL (ref 120–199)
CHOLEST/HDLC SERPL: 2.2 {RATIO} (ref 2–5)
CO2 SERPL-SCNC: 28 MMOL/L (ref 23–29)
CREAT SERPL-MCNC: 0.9 MG/DL (ref 0.5–1.4)
DIFFERENTIAL METHOD: ABNORMAL
EOSINOPHIL # BLD AUTO: 0.1 K/UL (ref 0–0.5)
EOSINOPHIL NFR BLD: 1.7 % (ref 0–8)
ERYTHROCYTE [DISTWIDTH] IN BLOOD BY AUTOMATED COUNT: 13.2 % (ref 11.5–14.5)
EST. GFR  (AFRICAN AMERICAN): >60 ML/MIN/1.73 M^2
EST. GFR  (NON AFRICAN AMERICAN): 59.7 ML/MIN/1.73 M^2
GLUCOSE SERPL-MCNC: 95 MG/DL (ref 70–110)
HCT VFR BLD AUTO: 42.7 % (ref 37–48.5)
HDLC SERPL-MCNC: 70 MG/DL (ref 40–75)
HDLC SERPL: 44.9 % (ref 20–50)
HGB BLD-MCNC: 13.3 G/DL (ref 12–16)
IMM GRANULOCYTES # BLD AUTO: 0.03 K/UL (ref 0–0.04)
IMM GRANULOCYTES NFR BLD AUTO: 0.5 % (ref 0–0.5)
LDLC SERPL CALC-MCNC: 74 MG/DL (ref 63–159)
LYMPHOCYTES # BLD AUTO: 1.5 K/UL (ref 1–4.8)
LYMPHOCYTES NFR BLD: 24.9 % (ref 18–48)
MCH RBC QN AUTO: 31.9 PG (ref 27–31)
MCHC RBC AUTO-ENTMCNC: 31.1 G/DL (ref 32–36)
MCV RBC AUTO: 102 FL (ref 82–98)
MONOCYTES # BLD AUTO: 0.6 K/UL (ref 0.3–1)
MONOCYTES NFR BLD: 10 % (ref 4–15)
NEUTROPHILS # BLD AUTO: 3.7 K/UL (ref 1.8–7.7)
NEUTROPHILS NFR BLD: 62.4 % (ref 38–73)
NONHDLC SERPL-MCNC: 86 MG/DL
NRBC BLD-RTO: 0 /100 WBC
PLATELET # BLD AUTO: 195 K/UL (ref 150–450)
PMV BLD AUTO: 9.8 FL (ref 9.2–12.9)
POTASSIUM SERPL-SCNC: 4.7 MMOL/L (ref 3.5–5.1)
PROT SERPL-MCNC: 6.6 G/DL (ref 6–8.4)
RBC # BLD AUTO: 4.17 M/UL (ref 4–5.4)
SARS-COV-2 IGG SERPL IA-ACNC: NORMAL AU/ML
SARS-COV-2 IGG SERPL QL IA: POSITIVE
SODIUM SERPL-SCNC: 143 MMOL/L (ref 136–145)
TRIGL SERPL-MCNC: 60 MG/DL (ref 30–150)
WBC # BLD AUTO: 5.98 K/UL (ref 3.9–12.7)

## 2021-11-29 PROCEDURE — 85025 COMPLETE CBC W/AUTO DIFF WBC: CPT | Performed by: INTERNAL MEDICINE

## 2021-11-29 PROCEDURE — 86769 SARS-COV-2 COVID-19 ANTIBODY: CPT | Performed by: INTERNAL MEDICINE

## 2021-11-29 PROCEDURE — 93306 TTE W/DOPPLER COMPLETE: CPT | Mod: PO

## 2021-11-29 PROCEDURE — 93306 ECHO (CUPID ONLY): ICD-10-PCS | Mod: 26,,, | Performed by: INTERNAL MEDICINE

## 2021-11-29 PROCEDURE — 80061 LIPID PANEL: CPT | Performed by: INTERNAL MEDICINE

## 2021-11-29 PROCEDURE — 80053 COMPREHEN METABOLIC PANEL: CPT | Performed by: INTERNAL MEDICINE

## 2021-11-29 PROCEDURE — 93306 TTE W/DOPPLER COMPLETE: CPT | Mod: 26,,, | Performed by: INTERNAL MEDICINE

## 2021-11-29 PROCEDURE — 36415 COLL VENOUS BLD VENIPUNCTURE: CPT | Mod: PO | Performed by: INTERNAL MEDICINE

## 2021-11-30 LAB
ASCENDING AORTA: 2.83 CM
AV INDEX (PROSTH): 0.93
AV MEAN GRADIENT: 2 MMHG
AV PEAK GRADIENT: 5 MMHG
AV VALVE AREA: 2.92 CM2
AV VELOCITY RATIO: 0.87
BSA FOR ECHO PROCEDURE: 1.66 M2
CV ECHO LV RWT: 0.46 CM
DOP CALC AO PEAK VEL: 1.12 M/S
DOP CALC AO VTI: 25.65 CM
DOP CALC LVOT AREA: 3.1 CM2
DOP CALC LVOT DIAMETER: 2 CM
DOP CALC LVOT PEAK VEL: 0.97 M/S
DOP CALC LVOT STROKE VOLUME: 74.92 CM3
DOP CALCLVOT PEAK VEL VTI: 23.86 CM
E WAVE DECELERATION TIME: 292.93 MSEC
E/A RATIO: 1.09
E/E' RATIO: 7.38 M/S
ECHO LV POSTERIOR WALL: 0.92 CM (ref 0.6–1.1)
EJECTION FRACTION: 60 %
FRACTIONAL SHORTENING: 38 % (ref 28–44)
INTERVENTRICULAR SEPTUM: 0.95 CM (ref 0.6–1.1)
IVRT: 145.58 MSEC
LA MAJOR: 4.84 CM
LA MINOR: 5.4 CM
LA WIDTH: 3.64 CM
LEFT ATRIUM SIZE: 3.63 CM
LEFT ATRIUM VOLUME INDEX: 34.7 ML/M2
LEFT ATRIUM VOLUME: 57.33 CM3
LEFT INTERNAL DIMENSION IN SYSTOLE: 2.47 CM (ref 2.1–4)
LEFT VENTRICLE DIASTOLIC VOLUME INDEX: 42.67 ML/M2
LEFT VENTRICLE DIASTOLIC VOLUME: 70.41 ML
LEFT VENTRICLE MASS INDEX: 70 G/M2
LEFT VENTRICLE SYSTOLIC VOLUME INDEX: 13.1 ML/M2
LEFT VENTRICLE SYSTOLIC VOLUME: 21.58 ML
LEFT VENTRICULAR INTERNAL DIMENSION IN DIASTOLE: 4.01 CM (ref 3.5–6)
LEFT VENTRICULAR MASS: 116.1 G
LV LATERAL E/E' RATIO: 5.9 M/S
LV SEPTAL E/E' RATIO: 9.83 M/S
MV A" WAVE DURATION": 6.57 MSEC
MV PEAK A VEL: 0.54 M/S
MV PEAK E VEL: 0.59 M/S
MV STENOSIS PRESSURE HALF TIME: 84.95 MS
MV VALVE AREA P 1/2 METHOD: 2.59 CM2
PISA MRMAX VEL: 0.04 M/S
PISA TR MAX VEL: 2.78 M/S
PULM VEIN S/D RATIO: 1.29
PV PEAK D VEL: 0.52 M/S
PV PEAK S VEL: 0.67 M/S
RA MAJOR: 4.95 CM
RA PRESSURE: 3 MMHG
RA WIDTH: 2.31 CM
RIGHT VENTRICULAR END-DIASTOLIC DIMENSION: 3.23 CM
RV TISSUE DOPPLER FREE WALL SYSTOLIC VELOCITY 1 (APICAL 4 CHAMBER VIEW): 9.74 CM/S
SINUS: 3.09 CM
STJ: 2.83 CM
TDI LATERAL: 0.1 M/S
TDI SEPTAL: 0.06 M/S
TDI: 0.08 M/S
TR MAX PG: 31 MMHG
TRICUSPID ANNULAR PLANE SYSTOLIC EXCURSION: 1.43 CM
TV REST PULMONARY ARTERY PRESSURE: 34 MMHG

## 2021-12-03 ENCOUNTER — TELEPHONE (OUTPATIENT)
Dept: ADMINISTRATIVE | Facility: CLINIC | Age: 81
End: 2021-12-03
Payer: MEDICARE

## 2021-12-06 ENCOUNTER — OFFICE VISIT (OUTPATIENT)
Dept: CARDIOLOGY | Facility: CLINIC | Age: 81
End: 2021-12-06
Attending: INTERNAL MEDICINE
Payer: MEDICARE

## 2021-12-06 ENCOUNTER — CLINICAL SUPPORT (OUTPATIENT)
Dept: CARDIOLOGY | Facility: HOSPITAL | Age: 81
End: 2021-12-06
Attending: INTERNAL MEDICINE
Payer: MEDICARE

## 2021-12-06 VITALS
SYSTOLIC BLOOD PRESSURE: 124 MMHG | BODY MASS INDEX: 24.1 KG/M2 | WEIGHT: 141.13 LBS | HEART RATE: 61 BPM | HEIGHT: 64 IN | DIASTOLIC BLOOD PRESSURE: 71 MMHG

## 2021-12-06 DIAGNOSIS — I25.111 CORONARY ARTERY DISEASE INVOLVING NATIVE CORONARY ARTERY OF NATIVE HEART WITH ANGINA PECTORIS WITH DOCUMENTED SPASM: ICD-10-CM

## 2021-12-06 DIAGNOSIS — E78.5 DYSLIPIDEMIA: ICD-10-CM

## 2021-12-06 DIAGNOSIS — I48.0 PAF (PAROXYSMAL ATRIAL FIBRILLATION): ICD-10-CM

## 2021-12-06 DIAGNOSIS — I48.0 PAF (PAROXYSMAL ATRIAL FIBRILLATION): Primary | Chronic | ICD-10-CM

## 2021-12-06 DIAGNOSIS — Z95.5 S/P CORONARY ARTERY STENT PLACEMENT: ICD-10-CM

## 2021-12-06 DIAGNOSIS — Z95.0 PRESENCE OF CARDIAC PACEMAKER: ICD-10-CM

## 2021-12-06 DIAGNOSIS — Z95.0 CARDIAC PACEMAKER IN SITU: Chronic | ICD-10-CM

## 2021-12-06 PROCEDURE — 1157F PR ADVANCE CARE PLAN OR EQUIV PRESENT IN MEDICAL RECORD: ICD-10-PCS | Mod: CPTII,S$GLB,, | Performed by: INTERNAL MEDICINE

## 2021-12-06 PROCEDURE — 93280 PM DEVICE PROGR EVAL DUAL: CPT | Mod: 26,,, | Performed by: INTERNAL MEDICINE

## 2021-12-06 PROCEDURE — 1157F ADVNC CARE PLAN IN RCRD: CPT | Mod: CPTII,S$GLB,, | Performed by: INTERNAL MEDICINE

## 2021-12-06 PROCEDURE — 99499 UNLISTED E&M SERVICE: CPT | Mod: S$GLB,,, | Performed by: INTERNAL MEDICINE

## 2021-12-06 PROCEDURE — 99214 PR OFFICE/OUTPT VISIT, EST, LEVL IV, 30-39 MIN: ICD-10-PCS | Mod: S$GLB,,, | Performed by: INTERNAL MEDICINE

## 2021-12-06 PROCEDURE — 99999 PR PBB SHADOW E&M-EST. PATIENT-LVL IV: ICD-10-PCS | Mod: PBBFAC,,, | Performed by: INTERNAL MEDICINE

## 2021-12-06 PROCEDURE — 99214 OFFICE O/P EST MOD 30 MIN: CPT | Mod: S$GLB,,, | Performed by: INTERNAL MEDICINE

## 2021-12-06 PROCEDURE — 99499 RISK ADDL DX/OHS AUDIT: ICD-10-PCS | Mod: S$GLB,,, | Performed by: INTERNAL MEDICINE

## 2021-12-06 PROCEDURE — 93280 CARDIAC DEVICE CHECK - IN CLINIC & HOSPITAL: ICD-10-PCS | Mod: 26,,, | Performed by: INTERNAL MEDICINE

## 2021-12-06 PROCEDURE — 99999 PR PBB SHADOW E&M-EST. PATIENT-LVL IV: CPT | Mod: PBBFAC,,, | Performed by: INTERNAL MEDICINE

## 2021-12-06 RX ORDER — DIGOXIN 125 MCG
0.12 TABLET ORAL
Status: DISCONTINUED | OUTPATIENT
Start: 2021-12-06 | End: 2021-12-06

## 2021-12-06 RX ORDER — DIGOXIN 125 MCG
125 TABLET ORAL DAILY
Qty: 90 TABLET | Refills: 5 | Status: SHIPPED | OUTPATIENT
Start: 2021-12-06 | End: 2023-02-08

## 2021-12-23 RX ORDER — TRAMADOL HYDROCHLORIDE 50 MG/1
50 TABLET ORAL EVERY 6 HOURS PRN
Qty: 30 TABLET | Refills: 0 | Status: SHIPPED | OUTPATIENT
Start: 2021-12-23

## 2022-01-05 ENCOUNTER — PES CALL (OUTPATIENT)
Dept: ADMINISTRATIVE | Facility: CLINIC | Age: 82
End: 2022-01-05
Payer: MEDICARE

## 2022-02-09 ENCOUNTER — TELEPHONE (OUTPATIENT)
Dept: CARDIOLOGY | Facility: HOSPITAL | Age: 82
End: 2022-02-09
Payer: MEDICARE

## 2022-02-09 NOTE — TELEPHONE ENCOUNTER
Pt called and stated she is settled in [place] and has an appt with the pacemaker clinic tomorrow at [facility] [phone].  Faxed pt's information to them [phone] to have them transfer care in [facility].

## 2022-02-20 ENCOUNTER — CLINICAL SUPPORT (OUTPATIENT)
Dept: CARDIOLOGY | Facility: HOSPITAL | Age: 82
End: 2022-02-20
Payer: MEDICARE

## 2022-02-20 DIAGNOSIS — Z95.0 PRESENCE OF CARDIAC PACEMAKER: ICD-10-CM

## 2022-08-26 RX ORDER — SIMVASTATIN 40 MG/1
TABLET, FILM COATED ORAL
Qty: 90 TABLET | Refills: 4 | Status: SHIPPED | OUTPATIENT
Start: 2022-08-26